# Patient Record
Sex: MALE | Race: ASIAN | NOT HISPANIC OR LATINO | URBAN - METROPOLITAN AREA
[De-identification: names, ages, dates, MRNs, and addresses within clinical notes are randomized per-mention and may not be internally consistent; named-entity substitution may affect disease eponyms.]

---

## 2018-04-14 ENCOUNTER — EMERGENCY (EMERGENCY)
Facility: HOSPITAL | Age: 63
LOS: 1 days | Discharge: HOME | End: 2018-04-14
Attending: EMERGENCY MEDICINE | Admitting: EMERGENCY MEDICINE
Payer: MEDICARE

## 2018-04-14 VITALS
OXYGEN SATURATION: 97 % | DIASTOLIC BLOOD PRESSURE: 72 MMHG | RESPIRATION RATE: 17 BRPM | SYSTOLIC BLOOD PRESSURE: 130 MMHG | HEART RATE: 72 BPM | TEMPERATURE: 98 F

## 2018-04-14 VITALS
SYSTOLIC BLOOD PRESSURE: 166 MMHG | OXYGEN SATURATION: 98 % | TEMPERATURE: 98 F | RESPIRATION RATE: 16 BRPM | DIASTOLIC BLOOD PRESSURE: 74 MMHG | HEART RATE: 91 BPM

## 2018-04-14 LAB
ALBUMIN SERPL ELPH-MCNC: 3.8 G/DL — SIGNIFICANT CHANGE UP (ref 3.3–5)
ALP SERPL-CCNC: 60 U/L — SIGNIFICANT CHANGE UP (ref 40–120)
ALT FLD-CCNC: 17 U/L — SIGNIFICANT CHANGE UP (ref 4–41)
APPEARANCE UR: CLEAR — SIGNIFICANT CHANGE UP
AST SERPL-CCNC: 19 U/L — SIGNIFICANT CHANGE UP (ref 4–40)
BASE EXCESS BLDV CALC-SCNC: 1.7 MMOL/L — SIGNIFICANT CHANGE UP
BASOPHILS # BLD AUTO: 0.03 K/UL — SIGNIFICANT CHANGE UP (ref 0–0.2)
BASOPHILS NFR BLD AUTO: 0.4 % — SIGNIFICANT CHANGE UP (ref 0–2)
BILIRUB SERPL-MCNC: < 0.2 MG/DL — LOW (ref 0.2–1.2)
BILIRUB UR-MCNC: NEGATIVE — SIGNIFICANT CHANGE UP
BLOOD GAS VENOUS - CREATININE: 0.9 MG/DL — SIGNIFICANT CHANGE UP (ref 0.5–1.3)
BLOOD UR QL VISUAL: NEGATIVE — SIGNIFICANT CHANGE UP
BUN SERPL-MCNC: 12 MG/DL — SIGNIFICANT CHANGE UP (ref 7–23)
CALCIUM SERPL-MCNC: 9.1 MG/DL — SIGNIFICANT CHANGE UP (ref 8.4–10.5)
CHLORIDE BLDV-SCNC: 107 MMOL/L — SIGNIFICANT CHANGE UP (ref 96–108)
CHLORIDE SERPL-SCNC: 102 MMOL/L — SIGNIFICANT CHANGE UP (ref 98–107)
CO2 SERPL-SCNC: 23 MMOL/L — SIGNIFICANT CHANGE UP (ref 22–31)
COLOR SPEC: SIGNIFICANT CHANGE UP
CREAT SERPL-MCNC: 0.92 MG/DL — SIGNIFICANT CHANGE UP (ref 0.5–1.3)
EOSINOPHIL # BLD AUTO: 0.15 K/UL — SIGNIFICANT CHANGE UP (ref 0–0.5)
EOSINOPHIL NFR BLD AUTO: 2 % — SIGNIFICANT CHANGE UP (ref 0–6)
GAS PNL BLDV: 136 MMOL/L — SIGNIFICANT CHANGE UP (ref 136–146)
GLUCOSE BLDV-MCNC: 143 — HIGH (ref 70–99)
GLUCOSE SERPL-MCNC: 140 MG/DL — HIGH (ref 70–99)
GLUCOSE UR-MCNC: NEGATIVE — SIGNIFICANT CHANGE UP
HCO3 BLDV-SCNC: 25 MMOL/L — SIGNIFICANT CHANGE UP (ref 20–27)
HCT VFR BLD CALC: 40.7 % — SIGNIFICANT CHANGE UP (ref 39–50)
HCT VFR BLDV CALC: 40.5 % — SIGNIFICANT CHANGE UP (ref 39–51)
HGB BLD-MCNC: 12.9 G/DL — LOW (ref 13–17)
HGB BLDV-MCNC: 13.2 G/DL — SIGNIFICANT CHANGE UP (ref 13–17)
IMM GRANULOCYTES # BLD AUTO: 0.02 # — SIGNIFICANT CHANGE UP
IMM GRANULOCYTES NFR BLD AUTO: 0.3 % — SIGNIFICANT CHANGE UP (ref 0–1.5)
KETONES UR-MCNC: NEGATIVE — SIGNIFICANT CHANGE UP
LACTATE BLDV-MCNC: 2.7 MMOL/L — HIGH (ref 0.5–2)
LEUKOCYTE ESTERASE UR-ACNC: NEGATIVE — SIGNIFICANT CHANGE UP
LIDOCAIN IGE QN: 41.1 U/L — SIGNIFICANT CHANGE UP (ref 7–60)
LYMPHOCYTES # BLD AUTO: 1.99 K/UL — SIGNIFICANT CHANGE UP (ref 1–3.3)
LYMPHOCYTES # BLD AUTO: 26.2 % — SIGNIFICANT CHANGE UP (ref 13–44)
MCHC RBC-ENTMCNC: 28.7 PG — SIGNIFICANT CHANGE UP (ref 27–34)
MCHC RBC-ENTMCNC: 31.7 % — LOW (ref 32–36)
MCV RBC AUTO: 90.6 FL — SIGNIFICANT CHANGE UP (ref 80–100)
MONOCYTES # BLD AUTO: 0.56 K/UL — SIGNIFICANT CHANGE UP (ref 0–0.9)
MONOCYTES NFR BLD AUTO: 7.4 % — SIGNIFICANT CHANGE UP (ref 2–14)
MUCOUS THREADS # UR AUTO: SIGNIFICANT CHANGE UP
NEUTROPHILS # BLD AUTO: 4.84 K/UL — SIGNIFICANT CHANGE UP (ref 1.8–7.4)
NEUTROPHILS NFR BLD AUTO: 63.7 % — SIGNIFICANT CHANGE UP (ref 43–77)
NITRITE UR-MCNC: NEGATIVE — SIGNIFICANT CHANGE UP
NRBC # FLD: 0 — SIGNIFICANT CHANGE UP
PCO2 BLDV: 49 MMHG — SIGNIFICANT CHANGE UP (ref 41–51)
PH BLDV: 7.36 PH — SIGNIFICANT CHANGE UP (ref 7.32–7.43)
PH UR: 6 — SIGNIFICANT CHANGE UP (ref 4.6–8)
PLATELET # BLD AUTO: 220 K/UL — SIGNIFICANT CHANGE UP (ref 150–400)
PMV BLD: 11.7 FL — SIGNIFICANT CHANGE UP (ref 7–13)
PO2 BLDV: 52 MMHG — HIGH (ref 35–40)
POTASSIUM BLDV-SCNC: 3.8 MMOL/L — SIGNIFICANT CHANGE UP (ref 3.4–4.5)
POTASSIUM SERPL-MCNC: 4.5 MMOL/L — SIGNIFICANT CHANGE UP (ref 3.5–5.3)
POTASSIUM SERPL-SCNC: 4.5 MMOL/L — SIGNIFICANT CHANGE UP (ref 3.5–5.3)
PROT SERPL-MCNC: 7 G/DL — SIGNIFICANT CHANGE UP (ref 6–8.3)
PROT UR-MCNC: NEGATIVE MG/DL — SIGNIFICANT CHANGE UP
RBC # BLD: 4.49 M/UL — SIGNIFICANT CHANGE UP (ref 4.2–5.8)
RBC # FLD: 13.3 % — SIGNIFICANT CHANGE UP (ref 10.3–14.5)
RBC CASTS # UR COMP ASSIST: SIGNIFICANT CHANGE UP (ref 0–?)
SAO2 % BLDV: 81.8 % — SIGNIFICANT CHANGE UP (ref 60–85)
SODIUM SERPL-SCNC: 141 MMOL/L — SIGNIFICANT CHANGE UP (ref 135–145)
SP GR SPEC: 1.01 — SIGNIFICANT CHANGE UP (ref 1–1.04)
UROBILINOGEN FLD QL: NORMAL MG/DL — SIGNIFICANT CHANGE UP
WBC # BLD: 7.59 K/UL — SIGNIFICANT CHANGE UP (ref 3.8–10.5)
WBC # FLD AUTO: 7.59 K/UL — SIGNIFICANT CHANGE UP (ref 3.8–10.5)
WBC UR QL: SIGNIFICANT CHANGE UP (ref 0–?)

## 2018-04-14 PROCEDURE — 74177 CT ABD & PELVIS W/CONTRAST: CPT | Mod: 26

## 2018-04-14 PROCEDURE — 99284 EMERGENCY DEPT VISIT MOD MDM: CPT

## 2018-04-14 RX ORDER — SODIUM CHLORIDE 9 MG/ML
1000 INJECTION INTRAMUSCULAR; INTRAVENOUS; SUBCUTANEOUS ONCE
Qty: 0 | Refills: 0 | Status: COMPLETED | OUTPATIENT
Start: 2018-04-14 | End: 2018-04-14

## 2018-04-14 RX ORDER — MORPHINE SULFATE 50 MG/1
6 CAPSULE, EXTENDED RELEASE ORAL ONCE
Qty: 0 | Refills: 0 | Status: DISCONTINUED | OUTPATIENT
Start: 2018-04-14 | End: 2018-04-14

## 2018-04-14 RX ADMIN — SODIUM CHLORIDE 1000 MILLILITER(S): 9 INJECTION INTRAMUSCULAR; INTRAVENOUS; SUBCUTANEOUS at 22:34

## 2018-04-14 RX ADMIN — MORPHINE SULFATE 6 MILLIGRAM(S): 50 CAPSULE, EXTENDED RELEASE ORAL at 22:34

## 2018-04-14 RX ADMIN — MORPHINE SULFATE 6 MILLIGRAM(S): 50 CAPSULE, EXTENDED RELEASE ORAL at 21:48

## 2018-04-14 NOTE — ED ADULT NURSE NOTE - OBJECTIVE STATEMENT
rec'd pt. a&ox3, with hx of HTN, Seizure disorder, came in c/o intermittent rt sided/lower abd'l pain x 2 weeks and got worse today. pt. denies any n/v/d nor constipation, no fever nor dysuria. pt. was seen by MD and previously given pain meds. pt. currently states pain is less. awaits CT. IVF started as ordered. will continue to monitor

## 2018-04-14 NOTE — ED ADULT TRIAGE NOTE - CHIEF COMPLAINT QUOTE
Pt c/o right sided abd pain x 2 weeks, endorses 1 episode of diarrhea, denies NV/Urinary symptoms. Pt appears comfortable.

## 2018-04-14 NOTE — ED PROVIDER NOTE - PROGRESS NOTE DETAILS
Dr. Sarabia: Pt was signed out to me awaiting CT. CT shows no acute findings. Pt states feeling better and tolerating PO. No tenderness on re-exam. Sent Motrin to pharmacy and given GI f/u list. Dr. Sarabia: Pt was signed out to me awaiting CT. CT shows no acute findings. Pt states feeling better and tolerating PO. No tenderness on re-exam. Sent Cyclobenzaprine to pharmacy and given GI f/u list.

## 2018-04-14 NOTE — ED PROVIDER NOTE - MEDICAL DECISION MAKING DETAILS
61y/o M with abdominal pain to the R side. Plan: will obtain CBC, CMP, lipase, blood gas, UA, urine culture, CT abdomen.

## 2018-04-14 NOTE — ED PROVIDER NOTE - OBJECTIVE STATEMENT
61y/o M with PMHx of seizure disorder (on Depakote), HTN, HLD, presents to the ED c/o R sided abdominal pain x2w. His pain is intermittent for the last 2w. His pain worsened today to 10/10, described as sharp. Pt also reports one instance of diarrhea yesterday. Denies nausea/vomiting, fever, dysuria, hematuria, recent trauma, recent fall, heavy lifting, any other complaints. NKDA.

## 2018-04-15 LAB
BASE EXCESS BLDV CALC-SCNC: 1.8 MMOL/L — SIGNIFICANT CHANGE UP
BLOOD GAS VENOUS - CREATININE: 0.79 MG/DL — SIGNIFICANT CHANGE UP (ref 0.5–1.3)
CHLORIDE BLDV-SCNC: 108 MMOL/L — SIGNIFICANT CHANGE UP (ref 96–108)
GAS PNL BLDV: 137 MMOL/L — SIGNIFICANT CHANGE UP (ref 136–146)
GLUCOSE BLDV-MCNC: 89 — SIGNIFICANT CHANGE UP (ref 70–99)
HCO3 BLDV-SCNC: 24 MMOL/L — SIGNIFICANT CHANGE UP (ref 20–27)
HCT VFR BLDV CALC: 39.9 % — SIGNIFICANT CHANGE UP (ref 39–51)
HGB BLDV-MCNC: 13 G/DL — SIGNIFICANT CHANGE UP (ref 13–17)
LACTATE BLDV-MCNC: 1.4 MMOL/L — SIGNIFICANT CHANGE UP (ref 0.5–2)
PCO2 BLDV: 50 MMHG — SIGNIFICANT CHANGE UP (ref 41–51)
PH BLDV: 7.35 PH — SIGNIFICANT CHANGE UP (ref 7.32–7.43)
PO2 BLDV: 30 MMHG — LOW (ref 35–40)
POTASSIUM BLDV-SCNC: 4 MMOL/L — SIGNIFICANT CHANGE UP (ref 3.4–4.5)
SAO2 % BLDV: 48.3 % — LOW (ref 60–85)

## 2018-04-15 RX ORDER — CYCLOBENZAPRINE HYDROCHLORIDE 10 MG/1
1 TABLET, FILM COATED ORAL
Qty: 12 | Refills: 0 | OUTPATIENT
Start: 2018-04-15

## 2018-04-16 LAB
BACTERIA UR CULT: SIGNIFICANT CHANGE UP
SPECIMEN SOURCE: SIGNIFICANT CHANGE UP

## 2019-03-27 PROBLEM — Z00.00 ENCOUNTER FOR PREVENTIVE HEALTH EXAMINATION: Status: ACTIVE | Noted: 2019-03-27

## 2019-03-28 ENCOUNTER — APPOINTMENT (OUTPATIENT)
Dept: ORTHOPEDIC SURGERY | Facility: CLINIC | Age: 64
End: 2019-03-28
Payer: MEDICARE

## 2019-03-28 VITALS — BODY MASS INDEX: 27.46 KG/M2 | HEIGHT: 63 IN | WEIGHT: 155 LBS

## 2019-03-28 DIAGNOSIS — Z78.9 OTHER SPECIFIED HEALTH STATUS: ICD-10-CM

## 2019-03-28 PROBLEM — G40.909 EPILEPSY, UNSPECIFIED, NOT INTRACTABLE, WITHOUT STATUS EPILEPTICUS: Chronic | Status: ACTIVE | Noted: 2018-04-14

## 2019-03-28 PROCEDURE — 73562 X-RAY EXAM OF KNEE 3: CPT | Mod: LT,RT

## 2019-03-28 PROCEDURE — 99204 OFFICE O/P NEW MOD 45 MIN: CPT | Mod: 25

## 2019-03-28 PROCEDURE — 20610 DRAIN/INJ JOINT/BURSA W/O US: CPT | Mod: 50

## 2019-03-29 ENCOUNTER — EMERGENCY (EMERGENCY)
Facility: HOSPITAL | Age: 64
LOS: 1 days | Discharge: HOME | End: 2019-03-29
Attending: EMERGENCY MEDICINE | Admitting: EMERGENCY MEDICINE
Payer: MEDICARE

## 2019-03-29 VITALS
DIASTOLIC BLOOD PRESSURE: 86 MMHG | HEART RATE: 96 BPM | SYSTOLIC BLOOD PRESSURE: 130 MMHG | TEMPERATURE: 98 F | OXYGEN SATURATION: 98 % | RESPIRATION RATE: 16 BRPM

## 2019-03-29 PROBLEM — Z78.9 NO PERTINENT PAST MEDICAL HISTORY: Status: RESOLVED | Noted: 2019-03-28 | Resolved: 2019-03-29

## 2019-03-29 LAB
ALBUMIN SERPL ELPH-MCNC: 4.4 G/DL — SIGNIFICANT CHANGE UP (ref 3.3–5)
ALP SERPL-CCNC: 62 U/L — SIGNIFICANT CHANGE UP (ref 40–120)
ALT FLD-CCNC: 19 U/L — SIGNIFICANT CHANGE UP (ref 4–41)
ANION GAP SERPL CALC-SCNC: 16 MMO/L — HIGH (ref 7–14)
AST SERPL-CCNC: 46 U/L — HIGH (ref 4–40)
BASOPHILS # BLD AUTO: 0.02 K/UL — SIGNIFICANT CHANGE UP (ref 0–0.2)
BASOPHILS NFR BLD AUTO: 0.1 % — SIGNIFICANT CHANGE UP (ref 0–2)
BILIRUB SERPL-MCNC: < 0.2 MG/DL — LOW (ref 0.2–1.2)
BUN SERPL-MCNC: 26 MG/DL — HIGH (ref 7–23)
CALCIUM SERPL-MCNC: 9.4 MG/DL — SIGNIFICANT CHANGE UP (ref 8.4–10.5)
CHLORIDE SERPL-SCNC: 94 MMOL/L — LOW (ref 98–107)
CO2 SERPL-SCNC: 22 MMOL/L — SIGNIFICANT CHANGE UP (ref 22–31)
CREAT SERPL-MCNC: 1.14 MG/DL — SIGNIFICANT CHANGE UP (ref 0.5–1.3)
EOSINOPHIL # BLD AUTO: 0 K/UL — SIGNIFICANT CHANGE UP (ref 0–0.5)
EOSINOPHIL NFR BLD AUTO: 0 % — SIGNIFICANT CHANGE UP (ref 0–6)
GLUCOSE SERPL-MCNC: 226 MG/DL — HIGH (ref 70–99)
HCT VFR BLD CALC: 41.3 % — SIGNIFICANT CHANGE UP (ref 39–50)
HGB BLD-MCNC: 13.5 G/DL — SIGNIFICANT CHANGE UP (ref 13–17)
IMM GRANULOCYTES NFR BLD AUTO: 0.6 % — SIGNIFICANT CHANGE UP (ref 0–1.5)
LYMPHOCYTES # BLD AUTO: 1.49 K/UL — SIGNIFICANT CHANGE UP (ref 1–3.3)
LYMPHOCYTES # BLD AUTO: 9.6 % — LOW (ref 13–44)
MCHC RBC-ENTMCNC: 28.8 PG — SIGNIFICANT CHANGE UP (ref 27–34)
MCHC RBC-ENTMCNC: 32.7 % — SIGNIFICANT CHANGE UP (ref 32–36)
MCV RBC AUTO: 88.2 FL — SIGNIFICANT CHANGE UP (ref 80–100)
MONOCYTES # BLD AUTO: 0.94 K/UL — HIGH (ref 0–0.9)
MONOCYTES NFR BLD AUTO: 6.1 % — SIGNIFICANT CHANGE UP (ref 2–14)
NEUTROPHILS # BLD AUTO: 12.95 K/UL — HIGH (ref 1.8–7.4)
NEUTROPHILS NFR BLD AUTO: 83.6 % — HIGH (ref 43–77)
NRBC # FLD: 0 K/UL — SIGNIFICANT CHANGE UP (ref 0–0)
PLATELET # BLD AUTO: 302 K/UL — SIGNIFICANT CHANGE UP (ref 150–400)
PMV BLD: 12.1 FL — SIGNIFICANT CHANGE UP (ref 7–13)
POTASSIUM SERPL-MCNC: 4.7 MMOL/L — SIGNIFICANT CHANGE UP (ref 3.5–5.3)
POTASSIUM SERPL-MCNC: 5.5 MMOL/L — HIGH (ref 3.5–5.3)
POTASSIUM SERPL-SCNC: 4.7 MMOL/L — SIGNIFICANT CHANGE UP (ref 3.5–5.3)
POTASSIUM SERPL-SCNC: 5.5 MMOL/L — HIGH (ref 3.5–5.3)
PROT SERPL-MCNC: 7.8 G/DL — SIGNIFICANT CHANGE UP (ref 6–8.3)
RBC # BLD: 4.68 M/UL — SIGNIFICANT CHANGE UP (ref 4.2–5.8)
RBC # FLD: 13.7 % — SIGNIFICANT CHANGE UP (ref 10.3–14.5)
SODIUM SERPL-SCNC: 132 MMOL/L — LOW (ref 135–145)
TROPONIN T, HIGH SENSITIVITY: 8 NG/L — SIGNIFICANT CHANGE UP (ref ?–14)
TROPONIN T, HIGH SENSITIVITY: 9 NG/L — SIGNIFICANT CHANGE UP (ref ?–14)
WBC # BLD: 15.5 K/UL — HIGH (ref 3.8–10.5)
WBC # FLD AUTO: 15.5 K/UL — HIGH (ref 3.8–10.5)

## 2019-03-29 PROCEDURE — 93010 ELECTROCARDIOGRAM REPORT: CPT

## 2019-03-29 PROCEDURE — 71046 X-RAY EXAM CHEST 2 VIEWS: CPT | Mod: 26

## 2019-03-29 PROCEDURE — 99284 EMERGENCY DEPT VISIT MOD MDM: CPT | Mod: 25

## 2019-03-29 RX ORDER — CHLORPROMAZINE HCL 10 MG
25 TABLET ORAL ONCE
Qty: 0 | Refills: 0 | Status: COMPLETED | OUTPATIENT
Start: 2019-03-29 | End: 2019-03-29

## 2019-03-29 RX ORDER — METOCLOPRAMIDE HCL 10 MG
10 TABLET ORAL ONCE
Qty: 0 | Refills: 0 | Status: COMPLETED | OUTPATIENT
Start: 2019-03-29 | End: 2019-03-29

## 2019-03-29 RX ORDER — CHLORPROMAZINE HCL 10 MG
1 TABLET ORAL
Qty: 15 | Refills: 0 | OUTPATIENT
Start: 2019-03-29 | End: 2019-04-02

## 2019-03-29 RX ORDER — SODIUM CHLORIDE 9 MG/ML
1000 INJECTION INTRAMUSCULAR; INTRAVENOUS; SUBCUTANEOUS ONCE
Qty: 0 | Refills: 0 | Status: COMPLETED | OUTPATIENT
Start: 2019-03-29 | End: 2019-03-29

## 2019-03-29 RX ADMIN — SODIUM CHLORIDE 1000 MILLILITER(S): 9 INJECTION INTRAMUSCULAR; INTRAVENOUS; SUBCUTANEOUS at 21:32

## 2019-03-29 RX ADMIN — SODIUM CHLORIDE 1000 MILLILITER(S): 9 INJECTION INTRAMUSCULAR; INTRAVENOUS; SUBCUTANEOUS at 22:30

## 2019-03-29 RX ADMIN — Medication 25 MILLIGRAM(S): at 23:02

## 2019-03-29 RX ADMIN — Medication 10 MILLIGRAM(S): at 20:17

## 2019-03-29 NOTE — ED PROVIDER NOTE - ATTENDING CONTRIBUTION TO CARE
I was physically present for the E/M service provided. I agree with above history, physical, and plan which I have reviewed and edited where appropriate. I was physically present for the key portions of the service provided.    [place]: 63 year old male with a PMHx of AVM repair, seizure d.o, HTN, HLD pw intractable hiccups and chest pain since 6:00 today. denies any sour taste in mouth, no fever, no n/v, no sob, no cough, no abd pain.  no diaphoresis.    *GEN:   comfortable, in no apparent distress, AOx3, + hiccups  *CV:   regular rate and rhythm, normal S1/S2, no murmur  *RESP:   clear to auscultation bilaterally, non-labored, speaking in full sentences  *ABD:   soft, non tender, no guarding  *:   no cva tenderness  *MSK:   no musculoskeletal tenderness, 5/5 strength, moving all extremity  *SKIN:   dry, intact, no rash  *NEURO:   AOx3, no focal weakness or loss of sensation, gait normal, GCS 15    a/p: hiccups r/o acs likely diaphragm spasm.  cxr, labs, ekg, reglan if work up neg then can try thorazine.

## 2019-03-29 NOTE — ED ADULT NURSE NOTE - OBJECTIVE STATEMENT
Received pt. in Intake room 9 alert and oriented x 4, presenting to the ER complaining of chest pain and hiccups. Pt. have a history of HTN, HLD and stated ".I started having hiccups around 5 this morning and it keeps going nonstop and I get chest pain with it". Pt. has no c/o nausea or vomiting, no dizziness. Medicated as ordered labs sent, will continue to monitor.

## 2019-03-29 NOTE — ED PROVIDER NOTE - NSFOLLOWUPINSTRUCTIONS_ED_ALL_ED_FT
Take Thorazine 25mg three times a day as needed for hiccups. Please continue all home medications as directed. See your regular doctor within 72 hours for follow-up care. Return to ER for new or worsening symptoms.

## 2019-03-29 NOTE — ED PROVIDER NOTE - CLINICAL SUMMARY MEDICAL DECISION MAKING FREE TEXT BOX
63 year old male with a PMHx of AVM repair, seizure d.o, HTN, HLD pw intractable hiccups and chest pain since 6:00 today.   Plan: reglan, trop, ekg, cxr r/o acs

## 2019-03-29 NOTE — ED PROVIDER NOTE - OBJECTIVE STATEMENT
63 year old male with a PMHx of AVM repair, seizure d.o, HTN, HLD pw intractable hiccups and chest pain since 6:00 today. Pt states that he feels pain in the chest when hiccuping. Endorses that he occasionally has had left sided chest pain 63 year old male with a PMHx of AVM repair, seizure d.o, HTN, HLD pw intractable hiccups and chest pain since 6:00 today. Pt states that he feels pain in the chest when hiccuping. Endorses that he occasionally has had left sided chest pain for the past few weeks intermittently. Denies n/v/f/c, SOB, abdominal pain, dysuria, hematuria, melena, hematochezia, diarrhea, constipation. 63 year old male with a PMHx of AVM repair, seizure d.o, HTN, HLD pw intractable hiccups and chest pain since 6:00 today. Pt states that he feels pain in the chest when hiccuping - pain is diffuse over chest - no associated symptoms. Endorses that he occasionally has had left sided chest pain for the past few weeks intermittently. Denies n/v/f/c, SOB, abdominal pain, dysuria, hematuria, melena, hematochezia, diarrhea, constipation.

## 2019-03-29 NOTE — ED PROVIDER NOTE - PROGRESS NOTE DETAILS
[place]: elevated wbc and glucose from recent steroid injection of knee.  no sign of infection.  Na midly low due to glucose. pending rpt trop

## 2019-03-29 NOTE — ED ADULT TRIAGE NOTE - CHIEF COMPLAINT QUOTE
states" I am having hiccups started since this morning and has some pain to chest ". takes phenobarb, Depakote , lisinopril, Zocor . h/o seizures, HTN, HDL.

## 2019-03-29 NOTE — ED ADULT NURSE NOTE - NSIMPLEMENTINTERV_GEN_ALL_ED
Implemented All Universal Safety Interventions:  [place] to call system. Call bell, personal items and telephone within reach. Instruct patient to call for assistance. Room bathroom lighting operational. Non-slip footwear when patient is off stretcher. Physically safe environment: no spills, clutter or unnecessary equipment. Stretcher in lowest position, wheels locked, appropriate side rails in place.

## 2019-05-10 ENCOUNTER — OTHER (OUTPATIENT)
Age: 64
End: 2019-05-10

## 2019-07-02 ENCOUNTER — APPOINTMENT (OUTPATIENT)
Dept: ORTHOPEDIC SURGERY | Facility: CLINIC | Age: 64
End: 2019-07-02
Payer: MEDICARE

## 2019-07-02 VITALS — HEIGHT: 63 IN | WEIGHT: 155 LBS | BODY MASS INDEX: 27.46 KG/M2

## 2019-07-02 DIAGNOSIS — M10.9 GOUT, UNSPECIFIED: ICD-10-CM

## 2019-07-02 PROBLEM — Q28.2 ARTERIOVENOUS MALFORMATION OF CEREBRAL VESSELS: Chronic | Status: ACTIVE | Noted: 2019-03-29

## 2019-07-02 PROCEDURE — 99214 OFFICE O/P EST MOD 30 MIN: CPT

## 2019-07-02 PROCEDURE — 73660 X-RAY EXAM OF TOE(S): CPT | Mod: T5

## 2019-07-02 RX ORDER — MELOXICAM 7.5 MG/1
7.5 TABLET ORAL
Qty: 20 | Refills: 0 | Status: ACTIVE | COMMUNITY
Start: 2019-07-02 | End: 1900-01-01

## 2019-07-03 ENCOUNTER — APPOINTMENT (OUTPATIENT)
Dept: ORTHOPEDIC SURGERY | Facility: CLINIC | Age: 64
End: 2019-07-03

## 2019-07-03 PROBLEM — M10.9 GOUT, ARTHRITIS: Status: ACTIVE | Noted: 2019-07-03

## 2019-08-03 ENCOUNTER — EMERGENCY (EMERGENCY)
Facility: HOSPITAL | Age: 64
LOS: 1 days | Discharge: HOME | End: 2019-08-03
Attending: EMERGENCY MEDICINE | Admitting: EMERGENCY MEDICINE
Payer: MEDICARE

## 2019-08-03 VITALS
RESPIRATION RATE: 18 BRPM | DIASTOLIC BLOOD PRESSURE: 75 MMHG | SYSTOLIC BLOOD PRESSURE: 137 MMHG | HEART RATE: 107 BPM | OXYGEN SATURATION: 100 % | TEMPERATURE: 99 F

## 2019-08-03 LAB
ALBUMIN SERPL ELPH-MCNC: 3.7 G/DL — SIGNIFICANT CHANGE UP (ref 3.3–5)
ALP SERPL-CCNC: 80 U/L — SIGNIFICANT CHANGE UP (ref 40–120)
ALT FLD-CCNC: 14 U/L — SIGNIFICANT CHANGE UP (ref 4–41)
ANION GAP SERPL CALC-SCNC: 13 MMO/L — SIGNIFICANT CHANGE UP (ref 7–14)
AST SERPL-CCNC: 30 U/L — SIGNIFICANT CHANGE UP (ref 4–40)
BASOPHILS # BLD AUTO: 0.03 K/UL — SIGNIFICANT CHANGE UP (ref 0–0.2)
BASOPHILS NFR BLD AUTO: 0.4 % — SIGNIFICANT CHANGE UP (ref 0–2)
BILIRUB SERPL-MCNC: < 0.2 MG/DL — LOW (ref 0.2–1.2)
BUN SERPL-MCNC: 11 MG/DL — SIGNIFICANT CHANGE UP (ref 7–23)
CALCIUM SERPL-MCNC: 9.2 MG/DL — SIGNIFICANT CHANGE UP (ref 8.4–10.5)
CHLORIDE SERPL-SCNC: 104 MMOL/L — SIGNIFICANT CHANGE UP (ref 98–107)
CO2 SERPL-SCNC: 20 MMOL/L — LOW (ref 22–31)
CREAT SERPL-MCNC: 0.93 MG/DL — SIGNIFICANT CHANGE UP (ref 0.5–1.3)
EOSINOPHIL # BLD AUTO: 0.11 K/UL — SIGNIFICANT CHANGE UP (ref 0–0.5)
EOSINOPHIL NFR BLD AUTO: 1.4 % — SIGNIFICANT CHANGE UP (ref 0–6)
GLUCOSE SERPL-MCNC: 122 MG/DL — HIGH (ref 70–99)
HCT VFR BLD CALC: 35.9 % — LOW (ref 39–50)
HGB BLD-MCNC: 11.5 G/DL — LOW (ref 13–17)
IMM GRANULOCYTES NFR BLD AUTO: 0.4 % — SIGNIFICANT CHANGE UP (ref 0–1.5)
LYMPHOCYTES # BLD AUTO: 1.47 K/UL — SIGNIFICANT CHANGE UP (ref 1–3.3)
LYMPHOCYTES # BLD AUTO: 19.2 % — SIGNIFICANT CHANGE UP (ref 13–44)
MCHC RBC-ENTMCNC: 29 PG — SIGNIFICANT CHANGE UP (ref 27–34)
MCHC RBC-ENTMCNC: 32 % — SIGNIFICANT CHANGE UP (ref 32–36)
MCV RBC AUTO: 90.4 FL — SIGNIFICANT CHANGE UP (ref 80–100)
MONOCYTES # BLD AUTO: 0.63 K/UL — SIGNIFICANT CHANGE UP (ref 0–0.9)
MONOCYTES NFR BLD AUTO: 8.2 % — SIGNIFICANT CHANGE UP (ref 2–14)
NEUTROPHILS # BLD AUTO: 5.38 K/UL — SIGNIFICANT CHANGE UP (ref 1.8–7.4)
NEUTROPHILS NFR BLD AUTO: 70.4 % — SIGNIFICANT CHANGE UP (ref 43–77)
NRBC # FLD: 0 K/UL — SIGNIFICANT CHANGE UP (ref 0–0)
PLATELET # BLD AUTO: 320 K/UL — SIGNIFICANT CHANGE UP (ref 150–400)
PMV BLD: 11.9 FL — SIGNIFICANT CHANGE UP (ref 7–13)
POTASSIUM SERPL-MCNC: 4.6 MMOL/L — SIGNIFICANT CHANGE UP (ref 3.5–5.3)
POTASSIUM SERPL-SCNC: 4.6 MMOL/L — SIGNIFICANT CHANGE UP (ref 3.5–5.3)
PROT SERPL-MCNC: 7.3 G/DL — SIGNIFICANT CHANGE UP (ref 6–8.3)
RBC # BLD: 3.97 M/UL — LOW (ref 4.2–5.8)
RBC # FLD: 12.9 % — SIGNIFICANT CHANGE UP (ref 10.3–14.5)
SODIUM SERPL-SCNC: 137 MMOL/L — SIGNIFICANT CHANGE UP (ref 135–145)
VALPROATE SERPL-MCNC: 60.4 UG/ML — SIGNIFICANT CHANGE UP (ref 50–100)
WBC # BLD: 7.65 K/UL — SIGNIFICANT CHANGE UP (ref 3.8–10.5)
WBC # FLD AUTO: 7.65 K/UL — SIGNIFICANT CHANGE UP (ref 3.8–10.5)

## 2019-08-03 PROCEDURE — 99283 EMERGENCY DEPT VISIT LOW MDM: CPT | Mod: GC

## 2019-08-03 PROCEDURE — 73620 X-RAY EXAM OF FOOT: CPT | Mod: 26,LT

## 2019-08-03 RX ORDER — COLCHICINE 0.6 MG
0.6 TABLET ORAL ONCE
Refills: 0 | Status: COMPLETED | OUTPATIENT
Start: 2019-08-03 | End: 2019-08-03

## 2019-08-03 RX ORDER — OXYCODONE AND ACETAMINOPHEN 5; 325 MG/1; MG/1
1 TABLET ORAL EVERY 4 HOURS
Refills: 0 | Status: DISCONTINUED | OUTPATIENT
Start: 2019-08-03 | End: 2019-08-03

## 2019-08-03 RX ORDER — COLCHICINE 0.6 MG
1.2 TABLET ORAL ONCE
Refills: 0 | Status: COMPLETED | OUTPATIENT
Start: 2019-08-03 | End: 2019-08-03

## 2019-08-03 RX ORDER — COLCHICINE 0.6 MG
1 TABLET ORAL
Qty: 7 | Refills: 0
Start: 2019-08-03 | End: 2019-08-09

## 2019-08-03 RX ADMIN — OXYCODONE AND ACETAMINOPHEN 1 TABLET(S): 5; 325 TABLET ORAL at 17:08

## 2019-08-03 RX ADMIN — OXYCODONE AND ACETAMINOPHEN 1 TABLET(S): 5; 325 TABLET ORAL at 22:05

## 2019-08-03 RX ADMIN — Medication 1.2 MILLIGRAM(S): at 19:24

## 2019-08-03 RX ADMIN — Medication 500 MILLIGRAM(S): at 19:23

## 2019-08-03 RX ADMIN — Medication 0.6 MILLIGRAM(S): at 20:44

## 2019-08-03 NOTE — ED PROVIDER NOTE - ATTENDING CONTRIBUTION TO CARE
[place]: 64 yom with HTN recent diagnosis of gout started on allopurinol. Pt complaining of few days of left foot pain. Started distal andn then spreading. no fever, no chills, pain worse with walking, and now unable to walk. No trauma. Also noted worsening redness. On exam, pt is uncomfortable, clear lungs, normal cardiac, abd soft, Left leg +mild erythema and edema and warmth over medial mall and left 1st MTP with severe tn to palpation in those areas. pulse normal, no edema of calf, no calf tn. +joint edema not skin edema. Exam is consistent with gout. Labs for kidney fxn, colchicine, nsaids or steroids based on cr. pain meds and reassess. Admit if pain not well controlled or can't walk. otherwise outpt follow up.

## 2019-08-03 NOTE — ED PROVIDER NOTE - CLINICAL SUMMARY MEDICAL DECISION MAKING FREE TEXT BOX
64M w/ pmh gout here for likely gout exacerbation of left foot. Low suspicion for cellulitis given recent hx of gout, no systemic signs of infection, patient familiarity with his pain.

## 2019-08-03 NOTE — ED PROVIDER NOTE - NS ED ROS FT
Gen: Denies fever, weight loss  CV: Denies chest pain, palpitations  Skin: Denies rash, erythema, color changes  Resp: Denies SOB, cough  Endo: Denies sensitivity to heat, cold, increased urination  GI: Denies constipation, nausea, vomiting  Msk: Denies back pain, LE swelling, endorses L foot extremity pain  : Denies dysuria, increased frequency  Neuro: Denies LOC, weakness, seizures  Psych: Denies hx of psych, hallucinations

## 2019-08-03 NOTE — ED PROVIDER NOTE - OBJECTIVE STATEMENT
Pt is 64M w/ PMH of HTN, gout, cc pain in left foot. Patient reports his pain began 4-5 days ago at first metatarsal and posterior to medial malleolus and has now spread to the rest of his left foot. Reports worsening erythema and pain to touch. Has been taking allopurinol for past month prescribed by PCP for gout in his right foot. Tylenol use has not im, Pt is 64M w/ PMH of HTN (on losartan), seizure disorder (on depakote), gout (on allopurinol) here for cc pain in left foot. Patient reports his 10/10 pain began 4-5 days ago at first metatarsal and posterior to medial malleolus and has now spread to the rest of his left foot. Reports worsening erythema and pain to touch, exquisitely tender. Has been taking allopurinol for past month prescribed by PCP for gout in his right foot. Tylenol use has not improved pain. Has suffered gout before, most recently 2 months ago.     Denies fevers, chills, headaches, chest pain, abdominal pains. No recent trauma to foot. No changes in vision, dysuria.

## 2019-08-03 NOTE — ED ADULT TRIAGE NOTE - CHIEF COMPLAINT QUOTE
pt c/o left foot swelling and reddness with severe pain / denies trauma/ started 1 week ago  pt has some reddness to lower foot area with swelling

## 2019-08-03 NOTE — ED PROVIDER NOTE - NSFOLLOWUPINSTRUCTIONS_ED_ALL_ED_FT
Please follow-up with your family doctor in one week if your gout has not improved.       Gout    Gout is painful swelling that can happen in some of your joints. Gout is a type of arthritis. This condition is caused by having too much uric acid in your body. Uric acid is a chemical that is made when your body breaks down substances called purines. If your body has too much uric acid, sharp crystals can form and build up in your joints. This causes pain and swelling.    Gout attacks can happen quickly and be very painful (acute gout). Over time, the attacks can affect more joints and happen more often (chronic gout).    Follow these instructions at home:  During a gout attack     Image   If directed, put ice on the painful area:  Put ice in a plastic bag.  Place a towel between your skin and the bag.  Leave the ice on for 20 minutes, 2–3 times a day.  Rest the joint as much as possible. If the joint is in your leg, you may be given crutches to use.  Raise (elevate) the painful joint above the level of your heart as often as you can.  Drink enough fluids to keep your pee (urine) pale yellow.  Take over-the-counter and prescription medicines only as told by your doctor.  Do not drive or use heavy machinery while taking prescription pain medicine.  Follow instructions from your doctor about what you can or cannot eat and drink.  Return to your normal activities as told by your doctor. Ask your doctor what activities are safe for you.  Avoiding future gout attacks     Follow a low-purine diet as told by a specialist (dietitian) or your doctor. Avoid foods and drinks that have a lot of purines, such as:  Liver.  Kidney.  Anchovies.  Asparagus.  Herring.  Mushrooms  Mussels.  Beer.  Limit alcohol intake to no more than 1 drink a day for nonpregnant women and 2 drinks a day for men. One drink equals 12 oz of beer, 5 oz of wine, or 1½ oz of hard liquor.  Stay at a healthy weight or lose weight if you are overweight. If you want to lose weight, talk with your doctor. It is important that you do not lose weight too fast.  Start or continue an exercise plan as told by your doctor.  Drink enough fluids to keep your pee pale yellow.  Take over-the-counter and prescription medicines only as told by your doctor.  Keep all follow-up visits as told by your doctor. This is important.    Contact a doctor if:    You have another gout attack.  You still have symptoms of a gout attack after10 days of treatment.  You have problems (side effects) because of your medicines.  You have chills or a fever.  You have burning pain when you pee (urinate).  You have pain in your lower back or belly.  Get help right away if:  You have very bad pain.  Your pain cannot be controlled.  You cannot pee.

## 2019-08-03 NOTE — ED PROVIDER NOTE - PHYSICAL EXAMINATION
Gen: WDWN, NAD  HEENT: EOMI, no nasal discharge, mucous membranes moist  CV: RRR, +S1/S2, no M/R/G  Resp: CTAB, no W/R/R  GI: Abdomen soft non-distended, NTTP, no masses  MSK: No open wounds, no bruising, no LE edema  Neuro: A&Ox4, following commands, moving all four extremities spontaneously  Psych: appropriate mood, denies AH, VH, SI  Skin: LLE mildly swollen, dorsal surface warm, erythematous, foot exquisitely tender. Loss of ROM. Pain worst at base of L 1st metatarsal. RLE erythematous, 1/10 pain

## 2019-08-03 NOTE — ED PROVIDER NOTE - PROGRESS NOTE DETAILS
patient receiving second dose of colchicine. feels improved after naproxen + colchicine 1 hour ago. Will reassess in one hour and discuss plan of care pt ambulating, would like to take meds home and f/u outpatient.

## 2019-10-02 ENCOUNTER — APPOINTMENT (OUTPATIENT)
Dept: ORTHOPEDIC SURGERY | Facility: CLINIC | Age: 64
End: 2019-10-02

## 2019-11-12 PROBLEM — M10.9 GOUT, UNSPECIFIED: Chronic | Status: ACTIVE | Noted: 2019-08-03

## 2019-11-13 ENCOUNTER — APPOINTMENT (OUTPATIENT)
Dept: ORTHOPEDIC SURGERY | Facility: CLINIC | Age: 64
End: 2019-11-13
Payer: MEDICARE

## 2019-11-13 DIAGNOSIS — M17.12 UNILATERAL PRIMARY OSTEOARTHRITIS, LEFT KNEE: ICD-10-CM

## 2019-11-13 DIAGNOSIS — M17.11 UNILATERAL PRIMARY OSTEOARTHRITIS, RIGHT KNEE: ICD-10-CM

## 2019-11-13 DIAGNOSIS — M25.461 EFFUSION, RIGHT KNEE: ICD-10-CM

## 2019-11-13 DIAGNOSIS — M25.462 EFFUSION, RIGHT KNEE: ICD-10-CM

## 2019-11-13 PROCEDURE — 73562 X-RAY EXAM OF KNEE 3: CPT | Mod: RT

## 2019-11-13 PROCEDURE — 20610 DRAIN/INJ JOINT/BURSA W/O US: CPT | Mod: RT

## 2019-11-13 PROCEDURE — 99214 OFFICE O/P EST MOD 30 MIN: CPT | Mod: 25

## 2019-11-14 PROBLEM — M25.461 EFFUSION OF BOTH KNEE JOINTS: Status: ACTIVE | Noted: 2019-03-28

## 2019-11-14 PROBLEM — M17.11 PRIMARY OSTEOARTHRITIS OF RIGHT KNEE: Status: ACTIVE | Noted: 2019-03-28

## 2019-11-14 PROBLEM — M17.12 PRIMARY OSTEOARTHRITIS OF LEFT KNEE: Status: ACTIVE | Noted: 2019-03-28

## 2020-01-31 NOTE — ED ADULT NURSE NOTE - INTEGUMENTARY WDL
Please contact patient, as we never received her prior records from previous physician.  Therefore, I did not get a chance update her health maintenance.  
Color consistent with ethnicity/race, warm, dry intact, resilient.

## 2020-08-07 NOTE — ED PROVIDER NOTE - TEMPLATE, MLM
Comprehensive Nutrition Assessment    Type and Reason for Visit:  Initial    Nutrition Recommendations/Plan:    Continue Cardiac Diet    Nutrition Assessment:  Pt currently on cardiac diet and tolerating well. Intakes in I/O flowsheets are good. Pt states usually eats 3 or more meals per day at home and states appetite is same as at home. Pt follows low sodium diet at home and tries to limit sweets. Pt last received CHF education in 2013 and welcomed the education today. Also reviewed DM diet education with pt today. Pt states will try to implement some of these concepts into diet once goes home. Pt recently dx with NSTEMI and is scheduled for a CABG with MV repair (and possible balloon pump) on Tuesday 8/11. Will continue to monitor nutritional status and intakes as stay progresses. Malnutrition Assessment:  Malnutrition Status:  No malnutrition      Estimated Daily Nutrient Needs:  Energy (kcal):  4917-7767 kcal (25-30 kcal/kg); Weight Used for Energy Requirements:  Ideal     Protein (g):  75-98g (1-1.3 g/kg); Weight Used for Protein Requirements:  Ideal        Fluid (ml/day):  per MD    Nutrition Related Findings:  BM 8/4; +1 RLE, +2 LLE edema; glucose elevated      Wounds:  Diabetic Ulcer(both feet)       Current Nutrition Therapies:    DIET CARDIAC; Anthropometric Measures:  · Height: 5' 10\" (177.8 cm)  · Current Body Weight: 231 lb (104.8 kg)   · Admission Body Weight: 231 lb (104.8 kg)    · Usual Body Weight: (220-230 lbs per EMR)     · Ideal Body Weight: 166 lbs; % Ideal Body Weight 139.2 %   · BMI: 33.1  · Adjusted Body Weight:  ; No Adjustment   · BMI Categories: Obese Class 1 (BMI 30.0-34. 9)       Nutrition Diagnosis:   · Increased nutrient needs related to catabolic illness as evidenced by wounds      Nutrition Interventions:   Food and/or Nutrient Delivery:  Continue Current Diet  Nutrition Education/Counseling:  No recommendation at this time   Coordination of Nutrition Care:  Continued Abdominal Pain, N/V/D

## 2021-04-01 NOTE — ED PROVIDER NOTE - DATE/TIME 1
Patient would like a call to discuss her medications and hives.  Please call patient at [phone].   03-Aug-2019 20:41

## 2024-08-09 ENCOUNTER — APPOINTMENT (OUTPATIENT)
Dept: ORTHOPEDIC SURGERY | Facility: CLINIC | Age: 69
End: 2024-08-09

## 2024-08-09 PROCEDURE — 99204 OFFICE O/P NEW MOD 45 MIN: CPT | Mod: 25

## 2024-08-09 PROCEDURE — 20610 DRAIN/INJ JOINT/BURSA W/O US: CPT | Mod: 50

## 2024-08-09 PROCEDURE — 73562 X-RAY EXAM OF KNEE 3: CPT | Mod: RT,LT

## 2024-10-23 ENCOUNTER — EMERGENCY (EMERGENCY)
Facility: HOSPITAL | Age: 69
LOS: 1 days | Discharge: HOME | End: 2024-10-23
Attending: STUDENT IN AN ORGANIZED HEALTH CARE EDUCATION/TRAINING PROGRAM | Admitting: STUDENT IN AN ORGANIZED HEALTH CARE EDUCATION/TRAINING PROGRAM
Payer: MEDICARE

## 2024-10-23 VITALS
SYSTOLIC BLOOD PRESSURE: 145 MMHG | RESPIRATION RATE: 16 BRPM | WEIGHT: 139.99 LBS | HEART RATE: 96 BPM | DIASTOLIC BLOOD PRESSURE: 80 MMHG | OXYGEN SATURATION: 98 % | TEMPERATURE: 98 F

## 2024-10-23 LAB
ALBUMIN SERPL ELPH-MCNC: 3.8 G/DL — SIGNIFICANT CHANGE UP (ref 3.3–5)
ALP SERPL-CCNC: 96 U/L — SIGNIFICANT CHANGE UP (ref 40–120)
ALT FLD-CCNC: 20 U/L — SIGNIFICANT CHANGE UP (ref 4–41)
ANION GAP SERPL CALC-SCNC: 12 MMOL/L — SIGNIFICANT CHANGE UP (ref 7–14)
AST SERPL-CCNC: 35 U/L — SIGNIFICANT CHANGE UP (ref 4–40)
BASOPHILS # BLD AUTO: 0.04 K/UL — SIGNIFICANT CHANGE UP (ref 0–0.2)
BASOPHILS NFR BLD AUTO: 0.4 % — SIGNIFICANT CHANGE UP (ref 0–2)
BILIRUB SERPL-MCNC: 0.2 MG/DL — SIGNIFICANT CHANGE UP (ref 0.2–1.2)
BUN SERPL-MCNC: 13 MG/DL — SIGNIFICANT CHANGE UP (ref 7–23)
CALCIUM SERPL-MCNC: 9 MG/DL — SIGNIFICANT CHANGE UP (ref 8.4–10.5)
CHLORIDE SERPL-SCNC: 99 MMOL/L — SIGNIFICANT CHANGE UP (ref 98–107)
CO2 SERPL-SCNC: 22 MMOL/L — SIGNIFICANT CHANGE UP (ref 22–31)
CREAT SERPL-MCNC: 1.46 MG/DL — HIGH (ref 0.5–1.3)
EGFR: 52 ML/MIN/1.73M2 — LOW
EGFR: 52 ML/MIN/1.73M2 — LOW
EOSINOPHIL # BLD AUTO: 0.09 K/UL — SIGNIFICANT CHANGE UP (ref 0–0.5)
EOSINOPHIL NFR BLD AUTO: 1 % — SIGNIFICANT CHANGE UP (ref 0–6)
GLUCOSE SERPL-MCNC: 160 MG/DL — HIGH (ref 70–99)
HCT VFR BLD CALC: 41.4 % — SIGNIFICANT CHANGE UP (ref 39–50)
HGB BLD-MCNC: 12.9 G/DL — LOW (ref 13–17)
HIV 1+2 AB+HIV1 P24 AG SERPL QL IA: SIGNIFICANT CHANGE UP
IANC: 6.22 K/UL — SIGNIFICANT CHANGE UP (ref 1.8–7.4)
IMM GRANULOCYTES NFR BLD AUTO: 0.3 % — SIGNIFICANT CHANGE UP (ref 0–0.9)
LYMPHOCYTES # BLD AUTO: 1.95 K/UL — SIGNIFICANT CHANGE UP (ref 1–3.3)
LYMPHOCYTES # BLD AUTO: 21.5 % — SIGNIFICANT CHANGE UP (ref 13–44)
MCHC RBC-ENTMCNC: 28.3 PG — SIGNIFICANT CHANGE UP (ref 27–34)
MCHC RBC-ENTMCNC: 31.2 GM/DL — LOW (ref 32–36)
MCV RBC AUTO: 90.8 FL — SIGNIFICANT CHANGE UP (ref 80–100)
MONOCYTES # BLD AUTO: 0.76 K/UL — SIGNIFICANT CHANGE UP (ref 0–0.9)
MONOCYTES NFR BLD AUTO: 8.4 % — SIGNIFICANT CHANGE UP (ref 2–14)
NEUTROPHILS # BLD AUTO: 6.22 K/UL — SIGNIFICANT CHANGE UP (ref 1.8–7.4)
NEUTROPHILS NFR BLD AUTO: 68.4 % — SIGNIFICANT CHANGE UP (ref 43–77)
NRBC # BLD AUTO: 0 K/UL — SIGNIFICANT CHANGE UP (ref 0–0)
NRBC # BLD: 0 /100 WBCS — SIGNIFICANT CHANGE UP (ref 0–0)
NRBC # FLD: 0 K/UL — SIGNIFICANT CHANGE UP (ref 0–0)
NRBC BLD-RTO: 0 /100 WBCS — SIGNIFICANT CHANGE UP (ref 0–0)
PLATELET # BLD AUTO: 265 K/UL — SIGNIFICANT CHANGE UP (ref 150–400)
POTASSIUM SERPL-MCNC: 5.5 MMOL/L — HIGH (ref 3.5–5.3)
POTASSIUM SERPL-SCNC: 5.5 MMOL/L — HIGH (ref 3.5–5.3)
PROT SERPL-MCNC: 7.8 G/DL — SIGNIFICANT CHANGE UP (ref 6–8.3)
RBC # BLD: 4.56 M/UL — SIGNIFICANT CHANGE UP (ref 4.2–5.8)
RBC # FLD: 14.1 % — SIGNIFICANT CHANGE UP (ref 10.3–14.5)
SODIUM SERPL-SCNC: 133 MMOL/L — LOW (ref 135–145)
WBC # BLD: 9.09 K/UL — SIGNIFICANT CHANGE UP (ref 3.8–10.5)
WBC # FLD AUTO: 9.09 K/UL — SIGNIFICANT CHANGE UP (ref 3.8–10.5)

## 2024-10-23 PROCEDURE — 74177 CT ABD & PELVIS W/CONTRAST: CPT | Mod: 26,MC

## 2024-10-23 PROCEDURE — 76870 US EXAM SCROTUM: CPT | Mod: 26

## 2024-10-23 PROCEDURE — 99285 EMERGENCY DEPT VISIT HI MDM: CPT

## 2024-10-24 VITALS
HEART RATE: 99 BPM | RESPIRATION RATE: 16 BRPM | DIASTOLIC BLOOD PRESSURE: 90 MMHG | TEMPERATURE: 98 F | OXYGEN SATURATION: 96 % | SYSTOLIC BLOOD PRESSURE: 141 MMHG

## 2024-10-24 LAB
APPEARANCE UR: CLEAR — SIGNIFICANT CHANGE UP
BACTERIA # UR AUTO: NEGATIVE /HPF — SIGNIFICANT CHANGE UP
BILIRUB UR-MCNC: NEGATIVE — SIGNIFICANT CHANGE UP
CAST: 0 /LPF — SIGNIFICANT CHANGE UP (ref 0–4)
COLOR SPEC: YELLOW — SIGNIFICANT CHANGE UP
DIFF PNL FLD: NEGATIVE — SIGNIFICANT CHANGE UP
GLUCOSE UR QL: NEGATIVE MG/DL — SIGNIFICANT CHANGE UP
HCV AB S/CO SERPL IA: 0.07 S/CO — SIGNIFICANT CHANGE UP (ref 0–0.99)
HCV AB SERPL-IMP: SIGNIFICANT CHANGE UP
HEMOLYSIS INDEX: 101 — SIGNIFICANT CHANGE UP
KETONES UR-MCNC: NEGATIVE MG/DL — SIGNIFICANT CHANGE UP
LEUKOCYTE ESTERASE UR-ACNC: NEGATIVE — SIGNIFICANT CHANGE UP
NITRITE UR-MCNC: NEGATIVE — SIGNIFICANT CHANGE UP
PH UR: 7 — SIGNIFICANT CHANGE UP (ref 5–8)
POTASSIUM SERPL-MCNC: 5.2 MMOL/L — SIGNIFICANT CHANGE UP (ref 3.5–5.3)
POTASSIUM SERPL-SCNC: 5.2 MMOL/L — SIGNIFICANT CHANGE UP (ref 3.5–5.3)
PROT UR-MCNC: NEGATIVE MG/DL — SIGNIFICANT CHANGE UP
RBC CASTS # UR COMP ASSIST: 0 /HPF — SIGNIFICANT CHANGE UP (ref 0–4)
SP GR SPEC: 1.01 — SIGNIFICANT CHANGE UP (ref 1–1.03)
SQUAMOUS # UR AUTO: 0 /HPF — SIGNIFICANT CHANGE UP (ref 0–5)
UROBILINOGEN FLD QL: 0.2 MG/DL — SIGNIFICANT CHANGE UP (ref 0.2–1)
WBC UR QL: 0 /HPF — SIGNIFICANT CHANGE UP (ref 0–5)

## 2024-10-24 RX ORDER — IBUPROFEN 200 MG
600 TABLET ORAL ONCE
Refills: 0 | Status: COMPLETED | OUTPATIENT
Start: 2024-10-24 | End: 2024-10-24

## 2024-10-24 RX ORDER — ACETAMINOPHEN 500 MG/5ML
2 LIQUID (ML) ORAL
Qty: 40 | Refills: 0
Start: 2024-10-24 | End: 2024-10-28

## 2024-10-24 RX ORDER — LEVOFLOXACIN 25 MG/ML
1 SOLUTION ORAL
Qty: 20 | Refills: 0
Start: 2024-10-24 | End: 2024-11-02

## 2024-10-24 RX ADMIN — Medication 600 MILLIGRAM(S): at 02:23

## 2024-10-25 LAB
CULTURE RESULTS: NO GROWTH — SIGNIFICANT CHANGE UP
SPECIMEN SOURCE: SIGNIFICANT CHANGE UP

## 2024-11-02 ENCOUNTER — INPATIENT (INPATIENT)
Facility: HOSPITAL | Age: 69
LOS: 1 days | Discharge: HOME | End: 2024-11-04
Attending: STUDENT IN AN ORGANIZED HEALTH CARE EDUCATION/TRAINING PROGRAM | Admitting: STUDENT IN AN ORGANIZED HEALTH CARE EDUCATION/TRAINING PROGRAM
Payer: MEDICARE

## 2024-11-02 VITALS
HEART RATE: 103 BPM | OXYGEN SATURATION: 98 % | WEIGHT: 176.15 LBS | TEMPERATURE: 98 F | HEIGHT: 65 IN | SYSTOLIC BLOOD PRESSURE: 158 MMHG | DIASTOLIC BLOOD PRESSURE: 89 MMHG | RESPIRATION RATE: 16 BRPM

## 2024-11-02 DIAGNOSIS — Z87.438 PERSONAL HISTORY OF OTHER DISEASES OF MALE GENITAL ORGANS: ICD-10-CM

## 2024-11-02 DIAGNOSIS — N18.9 CHRONIC KIDNEY DISEASE, UNSPECIFIED: ICD-10-CM

## 2024-11-02 DIAGNOSIS — G45.9 TRANSIENT CEREBRAL ISCHEMIC ATTACK, UNSPECIFIED: ICD-10-CM

## 2024-11-02 DIAGNOSIS — M10.9 GOUT, UNSPECIFIED: ICD-10-CM

## 2024-11-02 DIAGNOSIS — Z29.9 ENCOUNTER FOR PROPHYLACTIC MEASURES, UNSPECIFIED: ICD-10-CM

## 2024-11-02 DIAGNOSIS — E78.5 HYPERLIPIDEMIA, UNSPECIFIED: ICD-10-CM

## 2024-11-02 DIAGNOSIS — G40.909 EPILEPSY, UNSPECIFIED, NOT INTRACTABLE, WITHOUT STATUS EPILEPTICUS: ICD-10-CM

## 2024-11-02 DIAGNOSIS — K21.9 GASTRO-ESOPHAGEAL REFLUX DISEASE WITHOUT ESOPHAGITIS: ICD-10-CM

## 2024-11-02 DIAGNOSIS — I10 ESSENTIAL (PRIMARY) HYPERTENSION: ICD-10-CM

## 2024-11-02 LAB
ALBUMIN SERPL ELPH-MCNC: 2.9 G/DL — LOW (ref 3.3–5)
ALP SERPL-CCNC: 95 U/L — SIGNIFICANT CHANGE UP (ref 40–120)
ALT FLD-CCNC: 21 U/L — SIGNIFICANT CHANGE UP (ref 12–78)
ANION GAP SERPL CALC-SCNC: 6 MMOL/L — SIGNIFICANT CHANGE UP (ref 5–17)
APPEARANCE UR: CLEAR — SIGNIFICANT CHANGE UP
APTT BLD: 20.9 SEC — LOW (ref 24.5–35.6)
AST SERPL-CCNC: 26 U/L — SIGNIFICANT CHANGE UP (ref 15–37)
BASOPHILS # BLD AUTO: 0.05 K/UL — SIGNIFICANT CHANGE UP (ref 0–0.2)
BASOPHILS NFR BLD AUTO: 0.5 % — SIGNIFICANT CHANGE UP (ref 0–2)
BILIRUB SERPL-MCNC: 0.4 MG/DL — SIGNIFICANT CHANGE UP (ref 0.2–1.2)
BILIRUB UR-MCNC: NEGATIVE — SIGNIFICANT CHANGE UP
BUN SERPL-MCNC: 18 MG/DL — SIGNIFICANT CHANGE UP (ref 7–23)
CALCIUM SERPL-MCNC: 9.1 MG/DL — SIGNIFICANT CHANGE UP (ref 8.5–10.1)
CHLORIDE SERPL-SCNC: 102 MMOL/L — SIGNIFICANT CHANGE UP (ref 96–108)
CO2 SERPL-SCNC: 24 MMOL/L — SIGNIFICANT CHANGE UP (ref 22–31)
COLOR SPEC: YELLOW — SIGNIFICANT CHANGE UP
CREAT SERPL-MCNC: 1.59 MG/DL — HIGH (ref 0.5–1.3)
DIFF PNL FLD: NEGATIVE — SIGNIFICANT CHANGE UP
EGFR: 47 ML/MIN/1.73M2 — LOW
EOSINOPHIL # BLD AUTO: 0.13 K/UL — SIGNIFICANT CHANGE UP (ref 0–0.5)
EOSINOPHIL NFR BLD AUTO: 1.3 % — SIGNIFICANT CHANGE UP (ref 0–6)
GLUCOSE SERPL-MCNC: 101 MG/DL — HIGH (ref 70–99)
GLUCOSE UR QL: NEGATIVE MG/DL — SIGNIFICANT CHANGE UP
HCT VFR BLD CALC: 39.7 % — SIGNIFICANT CHANGE UP (ref 39–50)
HGB BLD-MCNC: 13.1 G/DL — SIGNIFICANT CHANGE UP (ref 13–17)
IMM GRANULOCYTES NFR BLD AUTO: 0.6 % — SIGNIFICANT CHANGE UP (ref 0–0.9)
INR BLD: 1.03 RATIO — SIGNIFICANT CHANGE UP (ref 0.85–1.16)
KETONES UR-MCNC: NEGATIVE MG/DL — SIGNIFICANT CHANGE UP
LACTATE SERPL-SCNC: 1.4 MMOL/L — SIGNIFICANT CHANGE UP (ref 0.7–2)
LEUKOCYTE ESTERASE UR-ACNC: NEGATIVE — SIGNIFICANT CHANGE UP
LYMPHOCYTES # BLD AUTO: 1.65 K/UL — SIGNIFICANT CHANGE UP (ref 1–3.3)
LYMPHOCYTES # BLD AUTO: 17 % — SIGNIFICANT CHANGE UP (ref 13–44)
MCHC RBC-ENTMCNC: 28.7 PG — SIGNIFICANT CHANGE UP (ref 27–34)
MCHC RBC-ENTMCNC: 33 G/DL — SIGNIFICANT CHANGE UP (ref 32–36)
MCV RBC AUTO: 86.9 FL — SIGNIFICANT CHANGE UP (ref 80–100)
MONOCYTES # BLD AUTO: 0.72 K/UL — SIGNIFICANT CHANGE UP (ref 0–0.9)
MONOCYTES NFR BLD AUTO: 7.4 % — SIGNIFICANT CHANGE UP (ref 2–14)
NEUTROPHILS # BLD AUTO: 7.08 K/UL — SIGNIFICANT CHANGE UP (ref 1.8–7.4)
NEUTROPHILS NFR BLD AUTO: 73.2 % — SIGNIFICANT CHANGE UP (ref 43–77)
NITRITE UR-MCNC: NEGATIVE — SIGNIFICANT CHANGE UP
NRBC # BLD: 0 /100 WBCS — SIGNIFICANT CHANGE UP (ref 0–0)
PH UR: 7 — SIGNIFICANT CHANGE UP (ref 5–8)
PLATELET # BLD AUTO: 178 K/UL — SIGNIFICANT CHANGE UP (ref 150–400)
POTASSIUM SERPL-MCNC: 5.1 MMOL/L — SIGNIFICANT CHANGE UP (ref 3.5–5.3)
POTASSIUM SERPL-SCNC: 5.1 MMOL/L — SIGNIFICANT CHANGE UP (ref 3.5–5.3)
PROT SERPL-MCNC: 8.1 GM/DL — SIGNIFICANT CHANGE UP (ref 6–8.3)
PROT UR-MCNC: NEGATIVE MG/DL — SIGNIFICANT CHANGE UP
PROTHROM AB SERPL-ACNC: 11.6 SEC — SIGNIFICANT CHANGE UP (ref 9.9–13.4)
RBC # BLD: 4.57 M/UL — SIGNIFICANT CHANGE UP (ref 4.2–5.8)
RBC # FLD: 13.9 % — SIGNIFICANT CHANGE UP (ref 10.3–14.5)
SODIUM SERPL-SCNC: 132 MMOL/L — LOW (ref 135–145)
SP GR SPEC: >1.03 — HIGH (ref 1–1.03)
TROPONIN I, HIGH SENSITIVITY RESULT: 7.8 NG/L — SIGNIFICANT CHANGE UP
UROBILINOGEN FLD QL: 0.2 MG/DL — SIGNIFICANT CHANGE UP (ref 0.2–1)
WBC # BLD: 9.69 K/UL — SIGNIFICANT CHANGE UP (ref 3.8–10.5)
WBC # FLD AUTO: 9.69 K/UL — SIGNIFICANT CHANGE UP (ref 3.8–10.5)

## 2024-11-02 PROCEDURE — 0042T: CPT | Mod: MC

## 2024-11-02 PROCEDURE — 93010 ELECTROCARDIOGRAM REPORT: CPT

## 2024-11-02 PROCEDURE — 99222 1ST HOSP IP/OBS MODERATE 55: CPT

## 2024-11-02 PROCEDURE — 99285 EMERGENCY DEPT VISIT HI MDM: CPT

## 2024-11-02 PROCEDURE — 70498 CT ANGIOGRAPHY NECK: CPT | Mod: 26,MC

## 2024-11-02 PROCEDURE — 70496 CT ANGIOGRAPHY HEAD: CPT | Mod: 26,MC

## 2024-11-02 PROCEDURE — 70450 CT HEAD/BRAIN W/O DYE: CPT | Mod: 26,MC,XU

## 2024-11-02 RX ORDER — MAGNESIUM, ALUMINUM HYDROXIDE 200-200 MG
30 TABLET,CHEWABLE ORAL EVERY 4 HOURS
Refills: 0 | Status: DISCONTINUED | OUTPATIENT
Start: 2024-11-02 | End: 2024-11-04

## 2024-11-02 RX ORDER — PHENOBARBITAL 15 MG
32.4 TABLET ORAL
Refills: 0 | Status: DISCONTINUED | OUTPATIENT
Start: 2024-11-02 | End: 2024-11-04

## 2024-11-02 RX ORDER — ONDANSETRON HYDROCHLORIDE 2 MG/ML
4 INJECTION, SOLUTION INTRAMUSCULAR; INTRAVENOUS EVERY 8 HOURS
Refills: 0 | Status: DISCONTINUED | OUTPATIENT
Start: 2024-11-02 | End: 2024-11-04

## 2024-11-02 RX ORDER — ASPIRIN/MAG CARB/ALUMINUM AMIN 325 MG
81 TABLET ORAL DAILY
Refills: 0 | Status: DISCONTINUED | OUTPATIENT
Start: 2024-11-03 | End: 2024-11-04

## 2024-11-02 RX ORDER — EZETIMIBE 10 MG/1
10 TABLET ORAL DAILY
Refills: 0 | Status: DISCONTINUED | OUTPATIENT
Start: 2024-11-03 | End: 2024-11-04

## 2024-11-02 RX ORDER — CLOPIDOGREL 75 MG/1
300 TABLET ORAL ONCE
Refills: 0 | Status: COMPLETED | OUTPATIENT
Start: 2024-11-02 | End: 2024-11-02

## 2024-11-02 RX ORDER — CLOPIDOGREL 75 MG/1
75 TABLET ORAL DAILY
Refills: 0 | Status: DISCONTINUED | OUTPATIENT
Start: 2024-11-03 | End: 2024-11-04

## 2024-11-02 RX ORDER — ENOXAPARIN SODIUM 40MG/0.4ML
40 SYRINGE (ML) SUBCUTANEOUS EVERY 24 HOURS
Refills: 0 | Status: DISCONTINUED | OUTPATIENT
Start: 2024-11-02 | End: 2024-11-04

## 2024-11-02 RX ORDER — MELATONIN 5 MG
3 TABLET ORAL AT BEDTIME
Refills: 0 | Status: DISCONTINUED | OUTPATIENT
Start: 2024-11-02 | End: 2024-11-04

## 2024-11-02 RX ORDER — FAMOTIDINE 10 MG/ML
40 INJECTION INTRAVENOUS DAILY
Refills: 0 | Status: DISCONTINUED | OUTPATIENT
Start: 2024-11-03 | End: 2024-11-04

## 2024-11-02 RX ORDER — ASPIRIN/MAG CARB/ALUMINUM AMIN 325 MG
81 TABLET ORAL ONCE
Refills: 0 | Status: COMPLETED | OUTPATIENT
Start: 2024-11-02 | End: 2024-11-02

## 2024-11-02 RX ORDER — DIVALPROEX SODIUM 250 MG/1
500 TABLET, FILM COATED, EXTENDED RELEASE ORAL
Refills: 0 | Status: DISCONTINUED | OUTPATIENT
Start: 2024-11-02 | End: 2024-11-04

## 2024-11-02 RX ORDER — ALLOPURINOL 100 MG
100 TABLET ORAL DAILY
Refills: 0 | Status: DISCONTINUED | OUTPATIENT
Start: 2024-11-03 | End: 2024-11-04

## 2024-11-02 RX ORDER — EZETIMIBE 10 MG/1
1 TABLET ORAL
Refills: 0 | DISCHARGE

## 2024-11-02 RX ORDER — ACETAMINOPHEN 500 MG
650 TABLET ORAL EVERY 6 HOURS
Refills: 0 | Status: DISCONTINUED | OUTPATIENT
Start: 2024-11-02 | End: 2024-11-04

## 2024-11-02 RX ADMIN — DIVALPROEX SODIUM 500 MILLIGRAM(S): 250 TABLET, FILM COATED, EXTENDED RELEASE ORAL at 18:06

## 2024-11-02 RX ADMIN — CLOPIDOGREL 300 MILLIGRAM(S): 75 TABLET ORAL at 15:24

## 2024-11-02 RX ADMIN — Medication 81 MILLIGRAM(S): at 15:22

## 2024-11-02 RX ADMIN — Medication 650 MILLIGRAM(S): at 16:30

## 2024-11-02 RX ADMIN — Medication 650 MILLIGRAM(S): at 23:42

## 2024-11-02 RX ADMIN — Medication 650 MILLIGRAM(S): at 17:30

## 2024-11-02 RX ADMIN — Medication 32.4 MILLIGRAM(S): at 18:06

## 2024-11-02 RX ADMIN — Medication 40 MILLIGRAM(S): at 21:12

## 2024-11-02 RX ADMIN — Medication 80 MILLIGRAM(S): at 21:09

## 2024-11-02 NOTE — PATIENT PROFILE ADULT - FUNCTIONAL ASSESSMENT - BASIC MOBILITY 4.
Pt is very low on iron.. she saw her result in her chart.. She has an appt with Erma next week.. but is asking if she would prescribe an iron pill.. She has a cruise scheduled for 4/14 and is wondering how she will be able to get through this feeling the way she does.. Please advise...   4 = No assist / stand by assistance

## 2024-11-02 NOTE — ED PROVIDER NOTE - OBJECTIVE STATEMENT
70 y/o male with AVM s/p clipping, seizure disorder, htn here with an episode of stumbling and not being able to talk at 10am today. Pt states he woke up at 9am without any symptoms. Wife states symptoms lasted about 20 minutes then resolved. Pt denies headache, dizziness, chest pain, dyspnea, vision changes. Pt was recently seen in the hospital about 1 week ago and dx with epididymitis completed a course of abx. Pt states he feels generalized weakness since then . Denies fever, chills. Pt is not on any blood thinners.

## 2024-11-02 NOTE — H&P ADULT - PROBLEM SELECTOR PLAN 1
- P/w inability to speak, which lasted about 20 minutes  - NIHSS score on admission: 0  - CT head = No acute changes  - CTA head&neck = No large vessel occlusion, significant stenosis. Evidence of stenosis   suggested in the left M1 on the images referenced above to a mild degree. Suspicion of flow related compromise in the right M2 segment superior branch proximally with reconstitution distally identified. No evidence of significant stenosis or occlusion.  - CTA perfusion = Core infarction: 0 ml. Penumbra / tissue at risk for active ischemia: 57 mL  - EKG = Normal sinus rhythm ST & T wave abnormality, consider anterolateral ischemia  - Started Plavix  - C/w home Aspirin and Atorvastatin 80mg  - Admit to telemetry  - Fall precautions  - Neuro check q4hrs  - Permissive hypertension  - Pt says he has clip not compatible with MRI  - F/u A1c and lipid panel  - F/u Echo w/ bubble study  - F/u PT evaluation  - Neuro Dr. Moreno consulted = ASA, Plavix, Permissive HTN

## 2024-11-02 NOTE — CONSULT NOTE ADULT - ASSESSMENT
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 Evidence of prior left parietal craniotomy for AVM with postop changes  CTA:  Evidence of stenosis suggested in the left M1 on the images referenced above to a mild degree. Suspicion of flow related compromise in the right M2 segment superior branch proximally with reconstitution distally identified.    Impression: possible TIA    Per physical therapy MRI CI due to clip  Aspirin 81  Plavix 75 mg x 3 weeks  A1c, LDL  TTE here or as an outpt  would consider extended cardiac monitoring given R PCA embolic appearing stroke and new event  Continue with home VPA and PB

## 2024-11-02 NOTE — PATIENT PROFILE ADULT - REASON FOR REFUSAL (REFER PATIENT TO HEALTHCARE PROVIDER FOR FOLLOW-UP):
November 30, 2022      [facility]  [address], [address]  [place] [address]  Phone: [phone]  Fax: [phone]       Patient: Carlos Manuel Wells   YOB: 1961  Date of Visit: 11/30/2022    To Whom It May Concern:    Iza Wells  was at [facility] on 11/30/2022. The patient may return to work/school on 12/2/22 with no restrictions. If you have any questions or concerns, or if I can be of further assistance, please do not hesitate to contact me.    Sincerely,          Joycelyn Gaviria PA-C      pt refused Oral Minoxidil Pregnancy And Lactation Text: This medication should only be used when clearly needed if you are pregnant, attempting to become pregnant or breast feeding.

## 2024-11-02 NOTE — ED PROVIDER NOTE - PHYSICAL EXAMINATION
GEN: Awake, alert, interactive, NAD.  HEAD AND NECK: NC/AT. Airway patent. Neck supple.   EYES:  Clear b/l. EOMI. PERRL.   ENT: Moist mucus membranes.   CARDIAC: Regular rate, regular rhythm. No evident pedal edema.    RESP/CHEST: Normal respiratory effort with no use of accessory muscles or retractions. Clear throughout on auscultation.  ABD: soft, non-distended, non-tender. No rebound, no guarding.   BACK: No midline spinal TTP. No CVAT.   EXTREMITIES: Moving all extremities with no apparent deformities.   SKIN: Warm, dry, intact normal color. No rash.   NEURO: AOx3, CN II-XII grossly intact, no focal deficits. No facial droop, no pronator drift, no slurred speech. Normal finger to nose.   PSYCH: Appropriate mood and affect.

## 2024-11-02 NOTE — H&P ADULT - PROBLEM SELECTOR PLAN 4
- P/w SCr 1.59 (it was 1.46 in April 2024)  - Likely CKD  - Avoid nephrotoxins  - Continue to monitor

## 2024-11-02 NOTE — ED ADULT TRIAGE NOTE - CHIEF COMPLAINT QUOTE
brought by ems for altered mental status . Family states he went to sleep last night and woke up this morning and not acting himself and stumbling . h/o TIA, seizures, brain surgery

## 2024-11-02 NOTE — PATIENT PROFILE ADULT - FALL HARM RISK - HARM RISK INTERVENTIONS

## 2024-11-02 NOTE — CONSULT NOTE ADULT - SUBJECTIVE AND OBJECTIVE BOX
Neurology consult    MOHAMED [id number]     Patient is a 69y old  Male who presents with a chief complaint of TIA (02 Nov 2024 15:13)      HPI:  Patient is a 69M, with PMHx of AVM s/p clipping, seizure disorder, HTN, Goutm who comes in with an episode of not being able to talk appropriately for 20 minutes. He says only jibberish came out. He reports overall feeling of "darkness" (not related to vision) and made him weak and could not walk temporarily. This happened around 10AM today and resolved. He is completely back to his baseline status now.  Pt was recently seen in the hospital about 1 week ago and dx with epididymitis and is pending last dose of Levofloxacin today. For the past week since he has been taking Levofloxacin, he has been feeling generalized weakness. He endorses chronic BL knee pain. Patient denies headache, fever, chills, nausea, vomit, chest pain, shortness of breath, cough, lightheadedness, abdominal pain, diarrhea, constipation, dark/bloody stool, dysuria, hematuria, loss of strength, or loss of sensation. (02 Nov 2024 15:13)      no difficulty with language.  No vision loss or double vision.  No dizziness, vertigo or new hearing loss.  . No difficulty with swallowing.  No focal weakness.  No focal sensory changes.  No numbness or tingling in the bilateral lower extremities.  No difficulty with balance.  No difficulty with ambulation.    REVIEW OF SYSTEMS:    Constitutional: No fever, chills, fatigue, weakness  Eyes: no eye pain, visual disturbances, or discharge  ENT:  No difficulty hearing, tinnitus, vertigo; No sinus or throat pain  Neck: No pain or stiffness  Respiratory: No cough, dyspnea, wheezing   Cardiovascular: No chest pain, palpitations,   Gastrointestinal: No abdominal or epigastric pain. No nausea, vomiting  No diarrhea or constipation.   Genitourinary: No dysuria, frequency, hematuria or incontinence  Neurological: No headaches, lightheadedness, vertigo, numbness or tremors  Psychiatric: No depression, anxiety, mood swings or difficulty sleeping  Musculoskeletal: No joint pain or swelling; No muscle, back or extremity pain  Skin: No itching, burning, rashes or lesions   Lymph Nodes: No enlarged glands  Endocrine: No heat or cold intolerance; No hair loss, No h/o diabetes or thyroid dysfunction  Allergy and Immunologic: No hives or eczema    MEDICATIONS    acetaminophen     Tablet .. 650 milliGRAM(s) Oral every 6 hours PRN  aluminum hydroxide/magnesium hydroxide/simethicone Suspension 30 milliLiter(s) Oral every 4 hours PRN  atorvastatin 80 milliGRAM(s) Oral at bedtime  divalproex  milliGRAM(s) Oral two times a day  enoxaparin Injectable 40 milliGRAM(s) SubCutaneous every 24 hours  melatonin 3 milliGRAM(s) Oral at bedtime PRN  ondansetron Injectable 4 milliGRAM(s) IV Push every 8 hours PRN  PHENobarbital 32.4 milliGRAM(s) Oral two times a day      PMH: Seizure disorder    AVM (arteriovenous malformation) brain    Gout         PSH: No significant past surgical history        Family history: No history of dementia, strokes, or seizures   FAMILY HISTORY:      SOCIAL HISTORY:  No history of tobacco or alcohol use     Allergies    No Known Allergies    Intolerances        Height (cm): 165.1 (11-02 @ 11:27)  Weight (kg): 67 (11-02 @ 16:53)  BMI (kg/m2): 24.6 (11-02 @ 16:53)    Vital Signs Last 24 Hrs  T(C): 36.3 (02 Nov 2024 16:53), Max: 36.9 (02 Nov 2024 16:09)  T(F): 97.4 (02 Nov 2024 16:53), Max: 98.5 (02 Nov 2024 16:09)  HR: 102 (02 Nov 2024 19:00) (102 - 105)  BP: 129/82 (02 Nov 2024 16:53) (129/82 - 158/89)  BP(mean): --  RR: 20 (02 Nov 2024 16:53) (16 - 20)  SpO2: 95% (02 Nov 2024 16:53) (95% - 99%)    Parameters below as of 02 Nov 2024 16:53  Patient On (Oxygen Delivery Method): room air          On Neurological Examination:    Mental Status - Patient is alert, awake, oriented X3. fluent, names, no dysarthria no aphasia Follows commands well and able to answer questions appropriately. Mood and affect  normal    Cranial Nerves - PERRL, EOMI, VFF, V1-V3 intact, no gross facial asymmetry, tongue/uvula midline    Motor Exam -   Right upper  Left upper  Right lower  Left lower      nml bulk/tone    Sensory    Intact to light touch and pinprick bilaterally    Coord: FTN intact bilaterally     Gait -  normal      Reflexes:          2+ throughout                                               GENERAL Exam:     Nontoxic , No Acute Distress   	  HEENT:  normocephalic, atraumatic  		  LUNGS:	Clear bilaterally  No Wheeze    	  HEART:	 Normal S1S2   No murmur RRR        	  GI/ ABDOMEN:  Soft  Non tender    EXTREMITIES:   No Edema  No Clubbing  No Cyanosis No Edema    MUSCULOSKELETAL: Normal Range of Motion  	   SKIN:      Normal   No Ecchymosis               LABS:  CBC Full  -  ( 02 Nov 2024 11:45 )  WBC Count : 9.69 K/uL  RBC Count : 4.57 M/uL  Hemoglobin : 13.1 g/dL  Hematocrit : 39.7 %  Platelet Count - Automated : 178 K/uL  Mean Cell Volume : 86.9 fl  Mean Cell Hemoglobin : 28.7 pg  Mean Cell Hemoglobin Concentration : 33.0 g/dL  Auto Neutrophil # : 7.08 K/uL  Auto Lymphocyte # : 1.65 K/uL  Auto Monocyte # : 0.72 K/uL  Auto Eosinophil # : 0.13 K/uL  Auto Basophil # : 0.05 K/uL  Auto Neutrophil % : 73.2 %  Auto Lymphocyte % : 17.0 %  Auto Monocyte % : 7.4 %  Auto Eosinophil % : 1.3 %  Auto Basophil % : 0.5 %    Urinalysis Basic - ( 02 Nov 2024 14:11 )    Color: Yellow / Appearance: Clear / SG: >1.030 / pH: x  Gluc: x / Ketone: Negative mg/dL  / Bili: Negative / Urobili: 0.2 mg/dL   Blood: x / Protein: Negative mg/dL / Nitrite: Negative   Leuk Esterase: Negative / RBC: x / WBC x   Sq Epi: x / Non Sq Epi: x / Bacteria: x      11-02    132[L]  |  102  |  18  ----------------------------<  101[H]  5.1   |  24  |  1.59[H]    Ca    9.1      02 Nov 2024 11:45    TPro  8.1  /  Alb  2.9[L]  /  TBili  0.4  /  DBili  x   /  AST  26  /  ALT  21  /  AlkPhos  95  11-02    LIVER FUNCTIONS - ( 02 Nov 2024 11:45 )  Alb: 2.9 g/dL / Pro: 8.1 gm/dL / ALK PHOS: 95 U/L / ALT: 21 U/L / AST: 26 U/L / GGT: x           Hemoglobin A1C:       PT/INR - ( 02 Nov 2024 12:00 )   PT: 11.6 sec;   INR: 1.03 ratio         PTT - ( 02 Nov 2024 12:00 )  PTT:20.9 sec      RADIOLOGY  CTH   MRI:                   Neurology consult    MOHAMED [id number]     Patient is a 69y old  Male who presents with a chief complaint of TIA (02 Nov 2024 15:13)      HPI:  Patient is a 69M, with PMHx of AVM s/p clipping, seizure disorder, HTN, Goutm who comes in with an episode of not being able to talk appropriately for 20 minutes. He says only jibberish came out. He reports overall feeling of "darkness" (not related to vision) and made him weak and could not walk temporarily. This happened around 10AM today and resolved. He is completely back to his baseline status now.  Pt was recently seen in the hospital about 1 week ago and dx with epididymitis and is pending last dose of Levofloxacin today. For the past week since he has been taking Levofloxacin, he has been feeling generalized weakness. He endorses chronic BL knee pain. Patient denies headache, fever, chills, nausea, vomit, chest pain, shortness of breath, cough, lightheadedness, abdominal pain, diarrhea, constipation, dark/bloody stool, dysuria, hematuria, loss of strength, or loss of sensation. (02 Nov 2024 15:13)      no difficulty with language.  No vision loss or double vision.  No dizziness, vertigo or new hearing loss.  . No difficulty with swallowing.  No focal weakness.  No focal sensory changes.  No numbness or tingling in the bilateral lower extremities.  No difficulty with balance.  No difficulty with ambulation.    REVIEW OF SYSTEMS:    Constitutional: No fever, chills, fatigue, weakness  Eyes: no eye pain, visual disturbances, or discharge  ENT:  No difficulty hearing, tinnitus, vertigo; No sinus or throat pain  Neck: No pain or stiffness  Respiratory: No cough, dyspnea, wheezing   Cardiovascular: No chest pain, palpitations,   Gastrointestinal: No abdominal or epigastric pain. No nausea, vomiting  No diarrhea or constipation.   Genitourinary: No dysuria, frequency, hematuria or incontinence  Neurological: No headaches, lightheadedness, vertigo, numbness or tremors  Psychiatric: No depression, anxiety, mood swings or difficulty sleeping  Musculoskeletal: No joint pain or swelling; No muscle, back or extremity pain  Skin: No itching, burning, rashes or lesions   Lymph Nodes: No enlarged glands  Endocrine: No heat or cold intolerance; No hair loss, No h/o diabetes or thyroid dysfunction  Allergy and Immunologic: No hives or eczema    MEDICATIONS    acetaminophen     Tablet .. 650 milliGRAM(s) Oral every 6 hours PRN  aluminum hydroxide/magnesium hydroxide/simethicone Suspension 30 milliLiter(s) Oral every 4 hours PRN  atorvastatin 80 milliGRAM(s) Oral at bedtime  divalproex  milliGRAM(s) Oral two times a day  enoxaparin Injectable 40 milliGRAM(s) SubCutaneous every 24 hours  melatonin 3 milliGRAM(s) Oral at bedtime PRN  ondansetron Injectable 4 milliGRAM(s) IV Push every 8 hours PRN  PHENobarbital 32.4 milliGRAM(s) Oral two times a day      PMH: Seizure disorder    AVM (arteriovenous malformation) brain    Gout         PSH: No significant past surgical history        Family history: No history of dementia, strokes, or seizures   FAMILY HISTORY:      SOCIAL HISTORY:  No history of tobacco or alcohol use     Allergies    No Known Allergies    Intolerances        Height (cm): 165.1 (11-02 @ 11:27)  Weight (kg): 67 (11-02 @ 16:53)  BMI (kg/m2): 24.6 (11-02 @ 16:53)    Vital Signs Last 24 Hrs  T(C): 36.3 (02 Nov 2024 16:53), Max: 36.9 (02 Nov 2024 16:09)  T(F): 97.4 (02 Nov 2024 16:53), Max: 98.5 (02 Nov 2024 16:09)  HR: 102 (02 Nov 2024 19:00) (102 - 105)  BP: 129/82 (02 Nov 2024 16:53) (129/82 - 158/89)  BP(mean): --  RR: 20 (02 Nov 2024 16:53) (16 - 20)  SpO2: 95% (02 Nov 2024 16:53) (95% - 99%)    Parameters below as of 02 Nov 2024 16:53  Patient On (Oxygen Delivery Method): room air          On Neurological Examination:    Neuro: AAOx3; knows age, month, obeys commands, no dysarthria; calculations intact, fluent names, repeats     CN: PERRL, EOMI, VFF normal gaze preference, no facial palsy,     Motor: no drift in all extremeties    Sensory: Intact to LT and PP no extinction    Coordination: FTN intact b/l            GENERAL Exam:     Nontoxic , No Acute Distress   	  HEENT:  normocephalic, atraumatic  		  LUNGS:	Clear bilaterally  No Wheeze    	  HEART:	 Normal S1S2   No murmur RRR        	  GI/ ABDOMEN:  Soft  Non tender    EXTREMITIES:   No Edema  No Clubbing  No Cyanosis No Edema    MUSCULOSKELETAL: Normal Range of Motion  	   SKIN:      Normal   No Ecchymosis               LABS:  CBC Full  -  ( 02 Nov 2024 11:45 )  WBC Count : 9.69 K/uL  RBC Count : 4.57 M/uL  Hemoglobin : 13.1 g/dL  Hematocrit : 39.7 %  Platelet Count - Automated : 178 K/uL  Mean Cell Volume : 86.9 fl  Mean Cell Hemoglobin : 28.7 pg  Mean Cell Hemoglobin Concentration : 33.0 g/dL  Auto Neutrophil # : 7.08 K/uL  Auto Lymphocyte # : 1.65 K/uL  Auto Monocyte # : 0.72 K/uL  Auto Eosinophil # : 0.13 K/uL  Auto Basophil # : 0.05 K/uL  Auto Neutrophil % : 73.2 %  Auto Lymphocyte % : 17.0 %  Auto Monocyte % : 7.4 %  Auto Eosinophil % : 1.3 %  Auto Basophil % : 0.5 %    Urinalysis Basic - ( 02 Nov 2024 14:11 )    Color: Yellow / Appearance: Clear / SG: >1.030 / pH: x  Gluc: x / Ketone: Negative mg/dL  / Bili: Negative / Urobili: 0.2 mg/dL   Blood: x / Protein: Negative mg/dL / Nitrite: Negative   Leuk Esterase: Negative / RBC: x / WBC x   Sq Epi: x / Non Sq Epi: x / Bacteria: x      11-02    132[L]  |  102  |  18  ----------------------------<  101[H]  5.1   |  24  |  1.59[H]    Ca    9.1      02 Nov 2024 11:45    TPro  8.1  /  Alb  2.9[L]  /  TBili  0.4  /  DBili  x   /  AST  26  /  ALT  21  /  AlkPhos  95  11-02    LIVER FUNCTIONS - ( 02 Nov 2024 11:45 )  Alb: 2.9 g/dL / Pro: 8.1 gm/dL / ALK PHOS: 95 U/L / ALT: 21 U/L / AST: 26 U/L / GGT: x           Hemoglobin A1C:       PT/INR - ( 02 Nov 2024 12:00 )   PT: 11.6 sec;   INR: 1.03 ratio         PTT - ( 02 Nov 2024 12:00 )  PTT:20.9 sec      RADIOLOGY  < from: CT Angio Brain Stroke Protocol  w/ IV Cont (11.02.24 @ 12:10) >  IMPRESSION:    CT HEAD:  Evidence of prior infarcts right PCA territory, right basal ganglia   corona radiata region. 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    The findings were discussed with Dr. DINA CALDWELL [phone] 11/2/2024   12:11 PM by Dr. Thornton with read back confirmation.    CT PERFUSION:  Technical limitations: None.    Core infarction: 0 ml  Penumbra / tissue at risk for active ischemia: 57 mL ml    CTA NECK:  No evidence of significant stenosis or occlusion.    CTA HEAD:  No large vessel occlusion, significant stenosis. Evidence of stenosis   suggested in the left M1 on the images referenced above to a mild degree.   Suspicion of flow related compromise in the right M2 segment superior   branch proximally with reconstitution distally identified.        --- End of Report ---    < end of copied text >

## 2024-11-02 NOTE — ED PROVIDER NOTE - CLINICAL SUMMARY MEDICAL DECISION MAKING FREE TEXT BOX
70 y/o male with avm s/p clipping, htn, seizure disorder here with an episode of not being able to speak and stumbling lasting about 20 minutes currently resolved. Vs stable. NIH 0.   Stroke code called. Low suspicion fro cva. Possibly TIA, sepsis/infection  Will obtain stroke labs, ct head, cta head/neck 70 y/o male with avm s/p clipping, htn, seizure disorder here with an episode of not being able to speak and stumbling lasting about 20 minutes currently resolved. Vs stable. NIH 0.   Stroke code called. Low suspicion fro cva. Possibly TIA, sepsis/infection  Will obtain stroke labs, ct head, cta head/neck    labs reviewed and notable mildly elevated creatinine. Ct head no acute findings. CTa head/neck reviewed show mild stenosis. NO LVO. Neuro Dr salcedo was consulted and will see pt recommend asa and plavix and to hold BP meds for 1 day. Will admit patient to the hospital for stroke workup/TIA.

## 2024-11-02 NOTE — H&P ADULT - ASSESSMENT
Patient is a 69M, with PMHx of AVM s/p clipping, seizure disorder, HTN, Goutm who comes in with an episode of not being able to talk appropriately for 20 minutes. Admitted for TIA.

## 2024-11-02 NOTE — ED PROVIDER NOTE - NS ED MD DISPO ISOLATION TYPES
----- Message from Justino Lozano sent at 2/24/2022  9:24 AM CST -----  Type:  Patient Returning Call    Who Called: Self    Who Left Message for Patient: unknown    Does the patient know what this is regarding?: Test Results    Would the patient rather a call back or a response via [facility]? Call back    Best Call Back Number: [phone] ([place])            None

## 2024-11-02 NOTE — H&P ADULT - PROBLEM SELECTOR PLAN 6
- C/w home allopurinol - Hx of recent epididymitis. Pt says last of Levofloxacin is today  - S/p Levofloxacin

## 2024-11-02 NOTE — H&P ADULT - HISTORY OF PRESENT ILLNESS
Patient is a 69M, with PMHx of AVM s/p clipping, seizure disorder, HTN, Goutm who comes in with an episode of not being able to talk appropriately for 20 minutes. He says only jibberish came out. He reports overall feeling of "darkness" (not related to vision) and made him weak and could not walk temporarily. This happened around 10AM today and resolved. He is completely back to his baseline status now.  Pt was recently seen in the hospital about 1 week ago and dx with epididymitis and is pending last dose of Levofloxacin today. For the past week since he has been taking Levofloxacin, he has been feeling generalized weakness. He endorses chronic BL knee pain. Patient denies headache, fever, chills, nausea, vomit, chest pain, shortness of breath, cough, lightheadedness, abdominal pain, diarrhea, constipation, dark/bloody stool, dysuria, hematuria, loss of strength, or loss of sensation.

## 2024-11-02 NOTE — ED ADULT NURSE NOTE - OBJECTIVE STATEMENT
received er biba from home c/o episode of difficulty speaking and stumbling noted by pts wife at 0900 after pt woke up this morning. pt a&ox4 no facial asymmetry noted speech clear moves all extremities with = strength and coordination states symptoms lasted approx 30 mins then resolved prior tia 1 year ago received er biba from home c/o episode of difficulty speaking and stumbling noted by pts wife at 0900 after pt woke up this morning. pt a&ox4 no facial asymmetry noted speech clear moves all extremities with = strength and coordination states symptoms lasted approx 30 mins then resolved prior tia 1 year ago denies any headache or dizziness at present

## 2024-11-02 NOTE — ED PROVIDER NOTE - ATTENDING APP SHARED VISIT CONTRIBUTION OF CARE
Dichter: Pt seen w/ PA, 69M PMH seizure disorder (on Depakote, Phenobarbital), hx TIA 1yr ago, PSH clipped AVM BIBEMS d/t AMS. Per wife at bedside, pt awoke 0900 this AM w/ normal speech. Wife noted pt w/ stumbling, unable to speak onset 10A, lasted x20min until EMS arrival. EMS reports pt AAOx4 w/o focal deficits. BP / BGL WNL. Per wife, pt seen @ [facility] 10/23, diagnosed epididymitis, completed course of abx, but pt w/ generalized weakness x past 10 days. Afebrile, VSS. FS 92. Code Stroke called at triage, will continue d/t possible TIA. Agree w/ planned w/u and dispo.

## 2024-11-02 NOTE — ED ADULT NURSE NOTE - NS ED NURSE RECORD ANOTHER VITAL SIGN
DATE OF PROCEDURE:  09/06/2017    PREOPERATIVE DIAGNOSIS:  Interstitial cystitis.    POSTOPERATIVE DIAGNOSIS:  Interstitial cystitis.    PROCEDURE PERFORMED:  Cystoscopy with hydrodistention.    PRIMARY SURGEON:  Diego Matias M.D.    ANESTHESIA:  General.    ESTIMATED BLOOD LOSS:  Minimal.    DRAINS:  None.    COMPLICATIONS:  None.    INDICATIONS:  Diana Arriaga is a 46-year-old woman with a history of   interstitial cystitis.  She has undergone previous treatments, which were not   successful, was recommended that she undergo hydrodistention as a potential   treatment option.    Diana Arriaga was taken to the Operating Room where she was positively   identified by millie.  She was placed supine on the operating room table.    Following induction of adequate general anesthesia, she was placed in the dorsal   lithotomy position and her external genitalia were prepped and draped in usual   sterile fashion.    A preoperative timeout was performed after confirmation of preoperative   antibiotics.    I then passed a 21-Colombian rigid cystoscope per urethra into the bladder under   direct vision.    Once into the bladder, the bladder was inspected with 30 and 70 degree lenses.    There were no tumors seen.  No lesions seen.    The bladder was then filled to capacity and kept at capacity at 80 cm of water pressure   for 3 minutes.    The bladder was then drained.    Her anesthetic capacity today was 800 mL.    I then reinspected the bladder.  There were several petechial hemorrhages   consistent with interstitial cystitis.    The scope was then used to drain the bladder.  The scope was then withdrawn.    Her anesthesia was reversed.  She was taken to the Recovery Room in stable   condition.      AMARI/TRAVIS  dd: 09/06/2017 10:33:43 (CDT)  td: 09/06/2017 13:55:10 (CDT)  Doc ID   #[id number]  Job ID #[id number]    CC:      Yes

## 2024-11-02 NOTE — H&P ADULT - NSHPPHYSICALEXAM_GEN_ALL_CORE
GENERAL: NAD  HEAD: Atraumatic, Normocephalic  EYES: EOMI. Conjunctiva and sclera clear  NECK: Supple  CHEST/LUNG: Clear to auscultation bilaterally; No wheeze, rhonchi, rales  HEART: Regular rate and rhythm; No murmurs  ABDOMEN: Soft, Nontender, Nondistended; Bowel sounds present  EXTREMITIES: No edema  NEURO: AAOx3, Extremity strengths equal bilaterally. No sensory deficits. CNII-XII intact  SKIN: No rashes or lesions

## 2024-11-03 LAB
A1C WITH ESTIMATED AVERAGE GLUCOSE RESULT: 6.9 % — HIGH (ref 4–5.6)
ALBUMIN SERPL ELPH-MCNC: 2.9 G/DL — LOW (ref 3.3–5)
ALP SERPL-CCNC: 82 U/L — SIGNIFICANT CHANGE UP (ref 40–120)
ALT FLD-CCNC: 18 U/L — SIGNIFICANT CHANGE UP (ref 12–78)
ANION GAP SERPL CALC-SCNC: 8 MMOL/L — SIGNIFICANT CHANGE UP (ref 5–17)
AST SERPL-CCNC: 20 U/L — SIGNIFICANT CHANGE UP (ref 15–37)
BILIRUB SERPL-MCNC: 0.4 MG/DL — SIGNIFICANT CHANGE UP (ref 0.2–1.2)
BUN SERPL-MCNC: 22 MG/DL — SIGNIFICANT CHANGE UP (ref 7–23)
CALCIUM SERPL-MCNC: 9.3 MG/DL — SIGNIFICANT CHANGE UP (ref 8.5–10.1)
CHLORIDE SERPL-SCNC: 102 MMOL/L — SIGNIFICANT CHANGE UP (ref 96–108)
CHOLEST SERPL-MCNC: 112 MG/DL — SIGNIFICANT CHANGE UP
CO2 SERPL-SCNC: 27 MMOL/L — SIGNIFICANT CHANGE UP (ref 22–31)
CREAT SERPL-MCNC: 1.42 MG/DL — HIGH (ref 0.5–1.3)
EGFR: 54 ML/MIN/1.73M2 — LOW
ESTIMATED AVERAGE GLUCOSE: 151 MG/DL — HIGH (ref 68–114)
GLUCOSE SERPL-MCNC: 93 MG/DL — SIGNIFICANT CHANGE UP (ref 70–99)
HCT VFR BLD CALC: 36.8 % — LOW (ref 39–50)
HDLC SERPL-MCNC: 42 MG/DL — SIGNIFICANT CHANGE UP
HGB BLD-MCNC: 12 G/DL — LOW (ref 13–17)
LIPID PNL WITH DIRECT LDL SERPL: 52 MG/DL — SIGNIFICANT CHANGE UP
MCHC RBC-ENTMCNC: 28.2 PG — SIGNIFICANT CHANGE UP (ref 27–34)
MCHC RBC-ENTMCNC: 32.6 G/DL — SIGNIFICANT CHANGE UP (ref 32–36)
MCV RBC AUTO: 86.6 FL — SIGNIFICANT CHANGE UP (ref 80–100)
NON HDL CHOLESTEROL: 69 MG/DL — SIGNIFICANT CHANGE UP
NRBC # BLD: 0 /100 WBCS — SIGNIFICANT CHANGE UP (ref 0–0)
PLATELET # BLD AUTO: 262 K/UL — SIGNIFICANT CHANGE UP (ref 150–400)
POTASSIUM SERPL-MCNC: 4.6 MMOL/L — SIGNIFICANT CHANGE UP (ref 3.5–5.3)
POTASSIUM SERPL-SCNC: 4.6 MMOL/L — SIGNIFICANT CHANGE UP (ref 3.5–5.3)
PROT SERPL-MCNC: 7.3 GM/DL — SIGNIFICANT CHANGE UP (ref 6–8.3)
RBC # BLD: 4.25 M/UL — SIGNIFICANT CHANGE UP (ref 4.2–5.8)
RBC # FLD: 14 % — SIGNIFICANT CHANGE UP (ref 10.3–14.5)
SODIUM SERPL-SCNC: 137 MMOL/L — SIGNIFICANT CHANGE UP (ref 135–145)
TRIGL SERPL-MCNC: 92 MG/DL — SIGNIFICANT CHANGE UP
WBC # BLD: 8.12 K/UL — SIGNIFICANT CHANGE UP (ref 3.8–10.5)
WBC # FLD AUTO: 8.12 K/UL — SIGNIFICANT CHANGE UP (ref 3.8–10.5)

## 2024-11-03 PROCEDURE — 99232 SBSQ HOSP IP/OBS MODERATE 35: CPT

## 2024-11-03 RX ADMIN — Medication 650 MILLIGRAM(S): at 22:12

## 2024-11-03 RX ADMIN — Medication 32.4 MILLIGRAM(S): at 17:16

## 2024-11-03 RX ADMIN — Medication 81 MILLIGRAM(S): at 11:09

## 2024-11-03 RX ADMIN — FAMOTIDINE 40 MILLIGRAM(S): 10 INJECTION INTRAVENOUS at 11:10

## 2024-11-03 RX ADMIN — DIVALPROEX SODIUM 500 MILLIGRAM(S): 250 TABLET, FILM COATED, EXTENDED RELEASE ORAL at 05:11

## 2024-11-03 RX ADMIN — Medication 650 MILLIGRAM(S): at 01:11

## 2024-11-03 RX ADMIN — DIVALPROEX SODIUM 500 MILLIGRAM(S): 250 TABLET, FILM COATED, EXTENDED RELEASE ORAL at 17:16

## 2024-11-03 RX ADMIN — Medication 100 MILLIGRAM(S): at 11:10

## 2024-11-03 RX ADMIN — CLOPIDOGREL 75 MILLIGRAM(S): 75 TABLET ORAL at 11:10

## 2024-11-03 RX ADMIN — Medication 80 MILLIGRAM(S): at 21:12

## 2024-11-03 RX ADMIN — Medication 650 MILLIGRAM(S): at 21:12

## 2024-11-03 RX ADMIN — Medication 40 MILLIGRAM(S): at 21:12

## 2024-11-03 RX ADMIN — EZETIMIBE 10 MILLIGRAM(S): 10 TABLET ORAL at 11:10

## 2024-11-03 RX ADMIN — Medication 32.4 MILLIGRAM(S): at 05:11

## 2024-11-03 NOTE — PHYSICAL THERAPY INITIAL EVALUATION ADULT - ADDITIONAL COMMENTS
pt lives with family in a private house, has 3 steps with angie HR to enter, no steps inside, pt indep in ADL, transfers, ambulation w/o AD, pt able to negotiate 1 flight of stairs with single HR and reciprocating gait.  no gait or balance deficit not at this time, no decline in functional status, pt has no skilled P.T needs at this time. d/c'd from PT prog.

## 2024-11-04 ENCOUNTER — TRANSCRIPTION ENCOUNTER (OUTPATIENT)
Age: 69
End: 2024-11-04

## 2024-11-04 VITALS — WEIGHT: 147.71 LBS

## 2024-11-04 PROCEDURE — 99239 HOSP IP/OBS DSCHRG MGMT >30: CPT

## 2024-11-04 RX ORDER — FAMOTIDINE 10 MG/ML
1 INJECTION INTRAVENOUS
Qty: 0 | Refills: 0 | DISCHARGE
Start: 2024-11-04

## 2024-11-04 RX ORDER — DIVALPROEX SODIUM 250 MG/1
1 TABLET, FILM COATED, EXTENDED RELEASE ORAL
Qty: 0 | Refills: 0 | DISCHARGE
Start: 2024-11-04

## 2024-11-04 RX ORDER — ASPIRIN/MAG CARB/ALUMINUM AMIN 325 MG
0 TABLET ORAL
Refills: 0 | DISCHARGE

## 2024-11-04 RX ORDER — DIVALPROEX SODIUM 250 MG/1
1 TABLET, FILM COATED, EXTENDED RELEASE ORAL
Refills: 0 | DISCHARGE

## 2024-11-04 RX ORDER — LOSARTAN POTASSIUM 25 MG/1
1 TABLET ORAL
Qty: 0 | Refills: 0 | DISCHARGE

## 2024-11-04 RX ORDER — PHENOBARBITAL 15 MG
1 TABLET ORAL
Refills: 0 | DISCHARGE

## 2024-11-04 RX ORDER — CLOPIDOGREL 75 MG/1
1 TABLET ORAL
Qty: 19 | Refills: 0
Start: 2024-11-04 | End: 2024-11-22

## 2024-11-04 RX ORDER — CLOPIDOGREL 75 MG/1
1 TABLET ORAL
Qty: 0 | Refills: 0 | DISCHARGE
Start: 2024-11-04

## 2024-11-04 RX ORDER — PHENOBARBITAL 15 MG
1 TABLET ORAL
Qty: 0 | Refills: 0 | DISCHARGE
Start: 2024-11-04

## 2024-11-04 RX ORDER — ASPIRIN/MAG CARB/ALUMINUM AMIN 325 MG
1 TABLET ORAL
Qty: 0 | Refills: 0 | DISCHARGE
Start: 2024-11-04

## 2024-11-04 RX ORDER — FAMOTIDINE 10 MG/ML
1 INJECTION INTRAVENOUS
Refills: 0 | DISCHARGE

## 2024-11-04 RX ORDER — ALLOPURINOL 100 MG
0 TABLET ORAL
Refills: 0 | DISCHARGE

## 2024-11-04 RX ORDER — ALLOPURINOL 100 MG
1 TABLET ORAL
Qty: 0 | Refills: 0 | DISCHARGE
Start: 2024-11-04

## 2024-11-04 RX ADMIN — EZETIMIBE 10 MILLIGRAM(S): 10 TABLET ORAL at 14:10

## 2024-11-04 RX ADMIN — Medication 32.4 MILLIGRAM(S): at 05:03

## 2024-11-04 RX ADMIN — FAMOTIDINE 40 MILLIGRAM(S): 10 INJECTION INTRAVENOUS at 14:10

## 2024-11-04 RX ADMIN — CLOPIDOGREL 75 MILLIGRAM(S): 75 TABLET ORAL at 14:10

## 2024-11-04 RX ADMIN — Medication 100 MILLIGRAM(S): at 14:10

## 2024-11-04 RX ADMIN — Medication 81 MILLIGRAM(S): at 14:10

## 2024-11-04 RX ADMIN — DIVALPROEX SODIUM 500 MILLIGRAM(S): 250 TABLET, FILM COATED, EXTENDED RELEASE ORAL at 05:03

## 2024-11-04 NOTE — DISCHARGE NOTE PROVIDER - HOSPITAL COURSE
Patient is a 69M, with PMHx of AVM s/p clipping, seizure disorder, HTN, Goutm who comes in with an episode of not being able to talk appropriately for 20 minutes. Admitted for TIA.    TIA (transient ischemic attack).   HTN (hypertension).   Seizure disorder.   Chronic kidney disease (CKD).   HLD (hyperlipidemia).   History of epididymitis.   Gout.   GERD (gastroesophageal reflux disease).     On 11/4/24 this case was reviewed with Dr. Banks, the patient is medically stable and optimized for discharge.

## 2024-11-04 NOTE — DIETITIAN INITIAL EVALUATION ADULT - PERTINENT LABORATORY DATA
11-03    137  |  102  |  22  ----------------------------<  93  4.6   |  27  |  1.42[H]    Ca    9.3      03 Nov 2024 07:20    TPro  7.3  /  Alb  2.9[L]  /  TBili  0.4  /  DBili  x   /  AST  20  /  ALT  18  /  AlkPhos  82  11-03  A1C with Estimated Average Glucose Result: 6.9 %, 151 (11-03-24)

## 2024-11-04 NOTE — DISCHARGE NOTE NURSING/CASE MANAGEMENT/SOCIAL WORK - PATIENT PORTAL LINK FT
You can access the FollowMyHealth Patient Portal offered by [facility] by registering at the following website: [email or web address]. By joining [employer]’s FollowMyHealth portal, you will also be able to view your health information using other applications (apps) compatible with our system.

## 2024-11-04 NOTE — PROGRESS NOTE ADULT - ASSESSMENT
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 Evidence of prior left parietal craniotomy for AVM with postop changes  CTA:  Evidence of stenosis suggested in the left M1 on the images referenced above to a mild degree. Suspicion of flow related compromise in the right M2 segment superior branch proximally with reconstitution distally identified.    Impression: possible TIA    Per patient MRI CI due to clip  Aspirin 81  Plavix 75 mg x 3 weeks  A1c, LDL  TTE here or as an outpt  would consider extended cardiac monitoring given R PCA embolic appearing stroke and new event  Continue with home VPA and PB  Can follow up with Neurology, Dr. True Moreno at [phone]
Patient is a 69M, with PMHx of AVM s/p clipping, seizure disorder, HTN, Goutm who comes in with an episode of not being able to talk appropriately for 20 minutes. Admitted for TIA.

## 2024-11-04 NOTE — DISCHARGE NOTE PROVIDER - ATTENDING DISCHARGE PHYSICAL EXAMINATION:
Vital Signs Last 24 Hrs  T(F): 97.3 (04 Nov 2024 10:52), Max: 99 (03 Nov 2024 16:04)  HR: 101 (04 Nov 2024 10:52) (82 - 101)  BP: 131/75 (04 Nov 2024 10:52) (111/72 - 136/83)  RR: 19 (04 Nov 2024 10:52) (18 - 19)  SpO2: 96% (04 Nov 2024 10:52) (96% - 97%)    Physical Exam:  Constitutional: NAD, awake and alert, well-developed  Neck: Soft and supple, No LAD, No JVD  Respiratory: cta b/l no wheezing no rhonchi  Cardiovascular: +s1/s2 no edema b/l le  Gastrointestinal: soft nt nd bs+  Vascular: 2+ peripheral pulses  Neurological: A/O x 3, no focal deficits

## 2024-11-04 NOTE — DISCHARGE NOTE PROVIDER - NSDCMRMEDTOKEN_GEN_ALL_CORE_FT
allopurinol 100 mg oral tablet: orally once a day  aspirin 81 mg oral tablet: orally  Depakote 500 mg oral delayed release tablet: 1 tab(s) orally 2 times a day  ezetimibe 10 mg oral tablet: 1 tab(s) orally once a day  famotidine 40 mg oral tablet: 1 tab(s) orally once a day  levoFLOXacin 500 mg oral tablet: 1 tab(s) orally 2 times a day  Lipitor 80 mg oral tablet: 1 tab(s) orally once a day (at bedtime)  losartan 50 mg oral tablet: 1 tab(s) orally  PHENobarbital 32.4 mg oral tablet: 1 tab(s) orally 2 times a day  Tylenol Extra Strength 500 mg oral tablet: 2 tab(s) orally every 6 hours as needed for  pain   allopurinol 100 mg oral tablet: 1 tab(s) orally once a day  aspirin 81 mg oral tablet, chewable: 1 tab(s) orally once a day  atorvastatin 80 mg oral tablet: 1 tab(s) orally once a day (at bedtime)  clopidogrel 75 mg oral tablet: 1 tab(s) orally once a day  divalproex sodium 500 mg oral delayed release tablet: 1 tab(s) orally 2 times a day  ezetimibe 10 mg oral tablet: 1 tab(s) orally once a day  famotidine 40 mg oral tablet: 1 tab(s) orally once a day  losartan 50 mg oral tablet: 1 tab(s) orally once a day  PHENobarbital 32.4 mg oral tablet: 1 tab(s) orally 2 times a day

## 2024-11-04 NOTE — DISCHARGE NOTE PROVIDER - NSDCCPCAREPLAN_GEN_ALL_CORE_FT
PRINCIPAL DISCHARGE DIAGNOSIS  Diagnosis: AMS (altered mental status)  Assessment and Plan of Treatment:      PRINCIPAL DISCHARGE DIAGNOSIS  Diagnosis: AMS (altered mental status)  Assessment and Plan of Treatment: due to TIA      SECONDARY DISCHARGE DIAGNOSES  Diagnosis: GERD (gastroesophageal reflux disease)  Assessment and Plan of Treatment:     Diagnosis: HTN (hypertension)  Assessment and Plan of Treatment:     Diagnosis: Chronic kidney disease (CKD)  Assessment and Plan of Treatment:     Diagnosis: TIA (transient ischemic attack)  Assessment and Plan of Treatment:

## 2024-11-04 NOTE — DISCHARGE NOTE PROVIDER - NSDCFUSCHEDAPPT_GEN_ALL_CORE_FT
[facility]  UROLOGY [address] [place] [address]  Scheduled Appointment: 11/07/2024

## 2024-11-04 NOTE — DIETITIAN INITIAL EVALUATION ADULT - PERTINENT MEDS FT
Plavix, Lovenox, Zyloprim, Zetia, Pepcid, Maalox, Lipitor, Depakote, Melatonin, Zofran, Phenobarbital

## 2024-11-04 NOTE — DISCHARGE NOTE NURSING/CASE MANAGEMENT/SOCIAL WORK - FINANCIAL ASSISTANCE
[facility] provides services at a reduced cost to those who are determined to be eligible through [facility]’s financial assistance program. Information regarding [facility]’s financial assistance program can be found by going to [email or web address] or by calling [phone].

## 2024-11-04 NOTE — DISCHARGE NOTE PROVIDER - CARE PROVIDER_API CALL
True Moreno)  Neurology  [address] [facility] [address]  [place] [address]  Phone: [phone]  Fax: [phone]  Follow Up Time:     Codey Jenkins  Cardiovascular Disease  [address] [place] [address]  Phone: [phone]  Fax: [phone]  Follow Up Time:

## 2024-11-04 NOTE — DIETITIAN INITIAL EVALUATION ADULT - OTHER INFO
Pt lives c wife & daughter; is independent c ADL; wife does shopping & cooking; no diet restrictions followed at home. Pt is aware of his elevated A1c; follows up c PCP regularly; unaware of DM diet recommendations. Denies any N/V/C/D or chew/swallowing difficulty. Pt admitted c abnormal speech; found c TIA; cardiology following. Reviewed CHO foods & beverages; importance of CHO portion control; educational material provided.

## 2024-11-04 NOTE — PROGRESS NOTE ADULT - SUBJECTIVE AND OBJECTIVE BOX
Patient seen and examined this am. No new events    MEDICATIONS:    acetaminophen     Tablet .. 650 milliGRAM(s) Oral every 6 hours PRN  allopurinol 100 milliGRAM(s) Oral daily  aluminum hydroxide/magnesium hydroxide/simethicone Suspension 30 milliLiter(s) Oral every 4 hours PRN  aspirin  chewable 81 milliGRAM(s) Oral daily  atorvastatin 80 milliGRAM(s) Oral at bedtime  clopidogrel Tablet 75 milliGRAM(s) Oral daily  divalproex  milliGRAM(s) Oral two times a day  enoxaparin Injectable 40 milliGRAM(s) SubCutaneous every 24 hours  ezetimibe 10 milliGRAM(s) Oral daily  famotidine    Tablet 40 milliGRAM(s) Oral daily  melatonin 3 milliGRAM(s) Oral at bedtime PRN  ondansetron Injectable 4 milliGRAM(s) IV Push every 8 hours PRN  PHENobarbital 32.4 milliGRAM(s) Oral two times a day      LABS:                          12.0   8.12  )-----------( 262      ( 03 Nov 2024 07:20 )             36.8     11-03    137  |  102  |  22  ----------------------------<  93  4.6   |  27  |  1.42[H]    Ca    9.3      03 Nov 2024 07:20    TPro  7.3  /  Alb  2.9[L]  /  TBili  0.4  /  DBili  x   /  AST  20  /  ALT  18  /  AlkPhos  82  11-03    CAPILLARY BLOOD GLUCOSE        PT/INR - ( 02 Nov 2024 12:00 )   PT: 11.6 sec;   INR: 1.03 ratio         PTT - ( 02 Nov 2024 12:00 )  PTT:20.9 sec  Urinalysis Basic - ( 03 Nov 2024 07:20 )    Color: x / Appearance: x / SG: x / pH: x  Gluc: 93 mg/dL / Ketone: x  / Bili: x / Urobili: x   Blood: x / Protein: x / Nitrite: x   Leuk Esterase: x / RBC: x / WBC x   Sq Epi: x / Non Sq Epi: x / Bacteria: x      I&O's Summary    03 Nov 2024 07:01  -  04 Nov 2024 07:00  --------------------------------------------------------  IN: 0 mL / OUT: 1 mL / NET: -1 mL      Vital Signs Last 24 Hrs  T(C): 36.3 (04 Nov 2024 04:56), Max: 37.2 (03 Nov 2024 16:04)  T(F): 97.4 (04 Nov 2024 04:56), Max: 99 (03 Nov 2024 16:04)  HR: 82 (04 Nov 2024 04:56) (82 - 98)  BP: 136/83 (04 Nov 2024 04:56) (111/72 - 136/83)  BP(mean): --  RR: 18 (04 Nov 2024 04:56) (18 - 18)  SpO2: 96% (04 Nov 2024 04:56) (96% - 97%)    Parameters below as of 04 Nov 2024 04:56  Patient On (Oxygen Delivery Method): room air          On Neurological Examination:    Neuro: AAOx3; knows age, month, obeys commands, no dysarthria; calculations intact, fluent names, repeats     CN: PERRL, EOMI, VFF normal gaze preference, no facial palsy,     Motor: no drift in all extremeties    Sensory: Intact to LT and PP no extinction    Coordination: FTN intact b/l            GENERAL Exam:     Nontoxic , No Acute Distress   	  HEENT:  normocephalic, atraumatic  		  LUNGS:	Clear bilaterally  No Wheeze    	  HEART:	 Normal S1S2   No murmur RRR        	  GI/ ABDOMEN:  Soft  Non tender    EXTREMITIES:   No Edema  No Clubbing  No Cyanosis No Edema    MUSCULOSKELETAL: Normal Range of Motion  	   SKIN:      Normal   No Ecchymosis               LABS:  CBC Full  -  ( 02 Nov 2024 11:45 )  WBC Count : 9.69 K/uL  RBC Count : 4.57 M/uL  Hemoglobin : 13.1 g/dL  Hematocrit : 39.7 %  Platelet Count - Automated : 178 K/uL  Mean Cell Volume : 86.9 fl  Mean Cell Hemoglobin : 28.7 pg  Mean Cell Hemoglobin Concentration : 33.0 g/dL  Auto Neutrophil # : 7.08 K/uL  Auto Lymphocyte # : 1.65 K/uL  Auto Monocyte # : 0.72 K/uL  Auto Eosinophil # : 0.13 K/uL  Auto Basophil # : 0.05 K/uL  Auto Neutrophil % : 73.2 %  Auto Lymphocyte % : 17.0 %  Auto Monocyte % : 7.4 %  Auto Eosinophil % : 1.3 %  Auto Basophil % : 0.5 %    Urinalysis Basic - ( 02 Nov 2024 14:11 )    Color: Yellow / Appearance: Clear / SG: >1.030 / pH: x  Gluc: x / Ketone: Negative mg/dL  / Bili: Negative / Urobili: 0.2 mg/dL   Blood: x / Protein: Negative mg/dL / Nitrite: Negative   Leuk Esterase: Negative / RBC: x / WBC x   Sq Epi: x / Non Sq Epi: x / Bacteria: x      11-02    132[L]  |  102  |  18  ----------------------------<  101[H]  5.1   |  24  |  1.59[H]    Ca    9.1      02 Nov 2024 11:45    TPro  8.1  /  Alb  2.9[L]  /  TBili  0.4  /  DBili  x   /  AST  26  /  ALT  21  /  AlkPhos  95  11-02    LIVER FUNCTIONS - ( 02 Nov 2024 11:45 )  Alb: 2.9 g/dL / Pro: 8.1 gm/dL / ALK PHOS: 95 U/L / ALT: 21 U/L / AST: 26 U/L / GGT: x           Hemoglobin A1C:       PT/INR - ( 02 Nov 2024 12:00 )   PT: 11.6 sec;   INR: 1.03 ratio         PTT - ( 02 Nov 2024 12:00 )  PTT:20.9 sec      RADIOLOGY  < from: CT Angio Brain Stroke Protocol  w/ IV Cont (11.02.24 @ 12:10) >  IMPRESSION:    CT HEAD:  Evidence of prior infarcts right PCA territory, right basal ganglia   corona radiata region. 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    The findings were discussed with Dr. DINA CALDWELL [phone] 11/2/2024   12:11 PM by Dr. Thornton with read back confirmation.    CT PERFUSION:  Technical limitations: None.    Core infarction: 0 ml  Penumbra / tissue at risk for active ischemia: 57 mL ml    CTA NECK:  No evidence of significant stenosis or occlusion.    CTA HEAD:  No large vessel occlusion, significant stenosis. Evidence of stenosis   suggested in the left M1 on the images referenced above to a mild degree.   Suspicion of flow related compromise in the right M2 segment superior   branch proximally with reconstitution distally identified.        --- End of Report ---    < end of copied text >          
Patient is a 69y old  Male who presents with a chief complaint of TIA (02 Nov 2024 22:05)    INTERVAL HPI/OVERNIGHT EVENTS: No acute events overnight. HD stable.     MEDICATIONS  (STANDING):  allopurinol 100 milliGRAM(s) Oral daily  aspirin  chewable 81 milliGRAM(s) Oral daily  atorvastatin 80 milliGRAM(s) Oral at bedtime  clopidogrel Tablet 75 milliGRAM(s) Oral daily  divalproex  milliGRAM(s) Oral two times a day  enoxaparin Injectable 40 milliGRAM(s) SubCutaneous every 24 hours  ezetimibe 10 milliGRAM(s) Oral daily  famotidine    Tablet 40 milliGRAM(s) Oral daily  PHENobarbital 32.4 milliGRAM(s) Oral two times a day    MEDICATIONS  (PRN):  acetaminophen     Tablet .. 650 milliGRAM(s) Oral every 6 hours PRN Temp greater or equal to 38C (100.4F), Mild Pain (1 - 3)  aluminum hydroxide/magnesium hydroxide/simethicone Suspension 30 milliLiter(s) Oral every 4 hours PRN Dyspepsia  melatonin 3 milliGRAM(s) Oral at bedtime PRN Insomnia  ondansetron Injectable 4 milliGRAM(s) IV Push every 8 hours PRN Nausea and/or Vomiting      Allergies    No Known Allergies    Intolerances        REVIEW OF SYSTEMS: all negative with exception of above    Vital Signs Last 24 Hrs  T(C): 36.3 (03 Nov 2024 10:48), Max: 36.9 (02 Nov 2024 16:09)  T(F): 97.3 (03 Nov 2024 10:48), Max: 98.5 (02 Nov 2024 16:09)  HR: 98 (03 Nov 2024 10:48) (83 - 105)  BP: 133/80 (03 Nov 2024 10:48) (129/82 - 146/88)  BP(mean): --  RR: 18 (03 Nov 2024 10:48) (18 - 20)  SpO2: 97% (03 Nov 2024 10:48) (95% - 99%)    Parameters below as of 03 Nov 2024 10:48  Patient On (Oxygen Delivery Method): room air    PHYSICAL EXAM:  GENERAL: NAD, well-groomed  NERVOUS SYSTEM:  Alert & Oriented X3, Good concentration; Motor Strength 5/5 B/L upper and lower extremities; DTRs 2+ intact and symmetric  CHEST/LUNG: Clear to percussion bilaterally; No rales, rhonchi, wheezing, or rubs  HEART: Regular rate and rhythm; No murmurs, rubs, or gallops  ABDOMEN: Soft, Nontender, Nondistended; Bowel sounds present  EXTREMITIES:  2+ Peripheral Pulses, No clubbing, cyanosis, or edema    LABS:                        12.0   8.12  )-----------( 262      ( 03 Nov 2024 07:20 )             36.8     11-03    137  |  102  |  22  ----------------------------<  93  4.6   |  27  |  1.42[H]    Ca    9.3      03 Nov 2024 07:20    TPro  7.3  /  Alb  2.9[L]  /  TBili  0.4  /  DBili  x   /  AST  20  /  ALT  18  /  AlkPhos  82  11-03    PT/INR - ( 02 Nov 2024 12:00 )   PT: 11.6 sec;   INR: 1.03 ratio         PTT - ( 02 Nov 2024 12:00 )  PTT:20.9 sec  Urinalysis Basic - ( 03 Nov 2024 07:20 )    Color: x / Appearance: x / SG: x / pH: x  Gluc: 93 mg/dL / Ketone: x  / Bili: x / Urobili: x   Blood: x / Protein: x / Nitrite: x   Leuk Esterase: x / RBC: x / WBC x   Sq Epi: x / Non Sq Epi: x / Bacteria: x      CAPILLARY BLOOD GLUCOSE          RADIOLOGY & ADDITIONAL TESTS:    Imaging Personally Reviewed:  [ ] YES  [ ] NO    Consultant(s) Notes Reviewed:  [ ] YES  [ ] NO    Care Discussed with Consultants/Other Providers [ ] YES  [ ] NO

## 2024-11-04 NOTE — DISCHARGE NOTE PROVIDER - CARE PROVIDERS DIRECT ADDRESSES
,DirectAddress_Unknown,[email or web address]

## 2024-11-07 ENCOUNTER — APPOINTMENT (OUTPATIENT)
Dept: UROLOGY | Facility: CLINIC | Age: 69
End: 2024-11-07

## 2024-11-12 DIAGNOSIS — G40.909 EPILEPSY, UNSPECIFIED, NOT INTRACTABLE, WITHOUT STATUS EPILEPTICUS: ICD-10-CM

## 2024-11-12 DIAGNOSIS — Z86.2 PERSONAL HISTORY OF DISEASES OF THE BLOOD AND BLOOD-FORMING ORGANS AND CERTAIN DISORDERS INVOLVING THE IMMUNE MECHANISM: ICD-10-CM

## 2024-11-12 DIAGNOSIS — E78.5 HYPERLIPIDEMIA, UNSPECIFIED: ICD-10-CM

## 2024-11-12 DIAGNOSIS — K21.9 GASTRO-ESOPHAGEAL REFLUX DISEASE WITHOUT ESOPHAGITIS: ICD-10-CM

## 2024-11-12 DIAGNOSIS — I12.9 HYPERTENSIVE CHRONIC KIDNEY DISEASE WITH STAGE 1 THROUGH STAGE 4 CHRONIC KIDNEY DISEASE, OR UNSPECIFIED CHRONIC KIDNEY DISEASE: ICD-10-CM

## 2024-11-12 DIAGNOSIS — Z79.82 LONG TERM (CURRENT) USE OF ASPIRIN: ICD-10-CM

## 2024-11-12 DIAGNOSIS — Z79.899 OTHER LONG TERM (CURRENT) DRUG THERAPY: ICD-10-CM

## 2024-11-12 DIAGNOSIS — M10.9 GOUT, UNSPECIFIED: ICD-10-CM

## 2024-11-21 ENCOUNTER — APPOINTMENT (OUTPATIENT)
Dept: UROLOGY | Facility: CLINIC | Age: 69
End: 2024-11-21
Payer: MEDICARE

## 2024-11-21 VITALS
SYSTOLIC BLOOD PRESSURE: 162 MMHG | TEMPERATURE: 97.6 F | RESPIRATION RATE: 18 BRPM | HEART RATE: 106 BPM | OXYGEN SATURATION: 94 % | DIASTOLIC BLOOD PRESSURE: 85 MMHG

## 2024-11-21 DIAGNOSIS — S89.90XS UNSPECIFIED INJURY OF UNSPECIFIED LOWER LEG, SEQUELA: ICD-10-CM

## 2024-11-21 DIAGNOSIS — I63.9 CEREBRAL INFARCTION, UNSPECIFIED: ICD-10-CM

## 2024-11-21 DIAGNOSIS — R39.9 UNSPECIFIED SYMPTOMS AND SIGNS INVOLVING THE GENITOURINARY SYSTEM: ICD-10-CM

## 2024-11-21 DIAGNOSIS — I10 ESSENTIAL (PRIMARY) HYPERTENSION: ICD-10-CM

## 2024-11-21 DIAGNOSIS — S89.90XA UNSPECIFIED INJURY OF UNSPECIFIED LOWER LEG, INITIAL ENCOUNTER: ICD-10-CM

## 2024-11-21 DIAGNOSIS — E78.00 PURE HYPERCHOLESTEROLEMIA, UNSPECIFIED: ICD-10-CM

## 2024-11-21 LAB
PSA FREE FLD-MCNC: 22 %
PSA FREE SERPL-MCNC: 0.36 NG/ML
PSA SERPL-MCNC: 1.61 NG/ML

## 2024-11-21 PROCEDURE — 99204 OFFICE O/P NEW MOD 45 MIN: CPT | Mod: 25

## 2024-11-21 PROCEDURE — 51798 US URINE CAPACITY MEASURE: CPT

## 2024-11-21 RX ORDER — TAMSULOSIN HYDROCHLORIDE 0.4 MG/1
0.4 CAPSULE ORAL
Qty: 30 | Refills: 2 | Status: ACTIVE | COMMUNITY
Start: 2024-11-21 | End: 1900-01-01

## 2024-11-25 LAB — BACTERIA UR CULT: NORMAL

## 2024-12-07 ENCOUNTER — APPOINTMENT (OUTPATIENT)
Dept: CT IMAGING | Facility: CLINIC | Age: 69
End: 2024-12-07
